# Patient Record
Sex: FEMALE | Race: WHITE | NOT HISPANIC OR LATINO | Employment: FULL TIME | URBAN - METROPOLITAN AREA
[De-identification: names, ages, dates, MRNs, and addresses within clinical notes are randomized per-mention and may not be internally consistent; named-entity substitution may affect disease eponyms.]

---

## 2020-08-12 ENCOUNTER — HISTORICAL (OUTPATIENT)
Dept: LAB | Facility: HOSPITAL | Age: 50
End: 2020-08-12

## 2020-08-12 LAB
ABS NEUT (OLG): 3 X10(3)/MCL (ref 2.1–9.2)
ALBUMIN SERPL-MCNC: 4 GM/DL (ref 3.5–5)
ALBUMIN/GLOB SERPL: 1.3 RATIO (ref 1.1–2)
ALP SERPL-CCNC: 50 UNIT/L (ref 40–150)
ALT SERPL-CCNC: 19 UNIT/L (ref 0–55)
AST SERPL-CCNC: 18 UNIT/L (ref 5–34)
BASOPHILS # BLD AUTO: 0.03 X10(3)/MCL (ref 0–0.2)
BASOPHILS NFR BLD AUTO: 0.6 % (ref 0–1)
BILIRUB SERPL-MCNC: 0.5 MG/DL (ref 0.2–1.2)
BILIRUBIN DIRECT+TOT PNL SERPL-MCNC: 0.2 MG/DL (ref 0–0.5)
BILIRUBIN DIRECT+TOT PNL SERPL-MCNC: 0.3 MG/DL (ref 0–0.8)
BUN SERPL-MCNC: 13.8 MG/DL (ref 7–18.7)
CALCIUM SERPL-MCNC: 9.3 MG/DL (ref 8.4–10.2)
CHLORIDE SERPL-SCNC: 105 MMOL/L (ref 98–107)
CHOLEST SERPL-MCNC: 166 MG/DL
CHOLEST/HDLC SERPL: 3 {RATIO} (ref 0–5)
CO2 SERPL-SCNC: 27 MMOL/L (ref 22–29)
CREAT SERPL-MCNC: 0.9 MG/DL (ref 0.57–1.11)
EOSINOPHIL # BLD AUTO: 0.15 X10(3)/MCL (ref 0–0.9)
EOSINOPHIL NFR BLD AUTO: 3 % (ref 0–6.4)
ERYTHROCYTE [DISTWIDTH] IN BLOOD BY AUTOMATED COUNT: 12.9 % (ref 11.5–17)
GLOBULIN SER-MCNC: 3 GM/DL (ref 2.4–3.5)
GLUCOSE SERPL-MCNC: 99 MG/DL (ref 74–100)
HCT VFR BLD AUTO: 42.9 % (ref 37–47)
HDLC SERPL-MCNC: 52 MG/DL (ref 40–60)
HGB BLD-MCNC: 14.5 GM/DL (ref 12–16)
IMM GRANULOCYTES # BLD AUTO: 0.01 10*3/UL (ref 0–0.02)
IMM GRANULOCYTES NFR BLD AUTO: 0.2 % (ref 0–0.43)
LDLC SERPL CALC-MCNC: 99 MG/DL (ref 50–140)
LYMPHOCYTES # BLD AUTO: 1.52 X10(3)/MCL (ref 0.6–4.6)
LYMPHOCYTES NFR BLD AUTO: 30.1 % (ref 16–44)
MCH RBC QN AUTO: 30.6 PG (ref 27–31)
MCHC RBC AUTO-ENTMCNC: 33.8 GM/DL (ref 33–36)
MCV RBC AUTO: 90.5 FL (ref 80–94)
MONOCYTES # BLD AUTO: 0.34 X10(3)/MCL (ref 0.1–1.3)
MONOCYTES NFR BLD AUTO: 6.7 % (ref 4–12.1)
NEUTROPHILS # BLD AUTO: 3 X10(3)/MCL (ref 2.1–9.2)
NEUTROPHILS NFR BLD AUTO: 59.4 % (ref 43–73)
NRBC BLD AUTO-RTO: 0 % (ref 0–0.2)
PLATELET # BLD AUTO: 200 X10(3)/MCL (ref 130–400)
PMV BLD AUTO: 9.8 FL (ref 7.4–10.4)
POTASSIUM SERPL-SCNC: 3.7 MMOL/L (ref 3.5–5.1)
PROT SERPL-MCNC: 7 GM/DL (ref 6.4–8.3)
RBC # BLD AUTO: 4.74 X10(6)/MCL (ref 4.2–5.4)
SODIUM SERPL-SCNC: 140 MMOL/L (ref 136–145)
T3FREE SERPL-MCNC: 2.7 PG/ML (ref 1.71–3.71)
T4 FREE SERPL-MCNC: 1.01 NG/DL (ref 0.7–1.48)
TRIGL SERPL-MCNC: 77 MG/DL (ref 0–150)
TSH SERPL-ACNC: 1.51 UIU/ML (ref 0.35–4.94)
VLDLC SERPL CALC-MCNC: 15 MG/DL
WBC # SPEC AUTO: 5 X10(3)/MCL (ref 4.5–11.5)

## 2021-03-05 ENCOUNTER — HISTORICAL (OUTPATIENT)
Dept: LAB | Facility: HOSPITAL | Age: 51
End: 2021-03-05

## 2021-03-05 LAB
ABS NEUT (OLG): 2.76 X10(3)/MCL (ref 2.1–9.2)
ALBUMIN SERPL-MCNC: 4.2 GM/DL (ref 3.5–5)
ALBUMIN/GLOB SERPL: 1.4 RATIO (ref 1.1–2)
ALP SERPL-CCNC: 46 UNIT/L (ref 40–150)
ALT SERPL-CCNC: 18 UNIT/L (ref 0–55)
AST SERPL-CCNC: 16 UNIT/L (ref 5–34)
BASOPHILS # BLD AUTO: 0.03 X10(3)/MCL (ref 0–0.2)
BASOPHILS NFR BLD AUTO: 0.6 % (ref 0–1)
BILIRUB SERPL-MCNC: 0.4 MG/DL (ref 0.2–1.2)
BILIRUBIN DIRECT+TOT PNL SERPL-MCNC: 0.2 MG/DL (ref 0–0.5)
BILIRUBIN DIRECT+TOT PNL SERPL-MCNC: 0.2 MG/DL (ref 0–0.8)
BUN SERPL-MCNC: 14.5 MG/DL (ref 7–18.7)
CALCIUM SERPL-MCNC: 9.2 MG/DL (ref 8.4–10.2)
CHLORIDE SERPL-SCNC: 107 MMOL/L (ref 98–107)
CHOLEST SERPL-MCNC: 163 MG/DL
CHOLEST/HDLC SERPL: 4 {RATIO} (ref 0–5)
CO2 SERPL-SCNC: 26 MMOL/L (ref 22–29)
CREAT SERPL-MCNC: 0.98 MG/DL (ref 0.57–1.11)
EOSINOPHIL # BLD AUTO: 0.32 X10(3)/MCL (ref 0–0.9)
EOSINOPHIL NFR BLD AUTO: 6.8 % (ref 0–6.4)
ERYTHROCYTE [DISTWIDTH] IN BLOOD BY AUTOMATED COUNT: 12.2 % (ref 11.5–17)
GLOBULIN SER-MCNC: 3 GM/DL (ref 2.4–3.5)
GLUCOSE SERPL-MCNC: 102 MG/DL (ref 74–100)
HCT VFR BLD AUTO: 44.5 % (ref 37–47)
HDLC SERPL-MCNC: 43 MG/DL (ref 40–60)
HGB BLD-MCNC: 14.5 GM/DL (ref 12–16)
IMM GRANULOCYTES # BLD AUTO: 0.01 10*3/UL (ref 0–0.02)
IMM GRANULOCYTES NFR BLD AUTO: 0.2 % (ref 0–0.43)
LDLC SERPL CALC-MCNC: 105 MG/DL (ref 50–140)
LYMPHOCYTES # BLD AUTO: 1.27 X10(3)/MCL (ref 0.6–4.6)
LYMPHOCYTES NFR BLD AUTO: 26.9 % (ref 16–44)
MCH RBC QN AUTO: 29.9 PG (ref 27–31)
MCHC RBC AUTO-ENTMCNC: 32.6 GM/DL (ref 33–36)
MCV RBC AUTO: 91.8 FL (ref 80–94)
MONOCYTES # BLD AUTO: 0.33 X10(3)/MCL (ref 0.1–1.3)
MONOCYTES NFR BLD AUTO: 7 % (ref 4–12.1)
NEUTROPHILS # BLD AUTO: 2.76 X10(3)/MCL (ref 2.1–9.2)
NEUTROPHILS NFR BLD AUTO: 58.5 % (ref 43–73)
NRBC BLD AUTO-RTO: 0 % (ref 0–0.2)
PLATELET # BLD AUTO: 220 X10(3)/MCL (ref 130–400)
PMV BLD AUTO: 9.8 FL (ref 7.4–10.4)
POTASSIUM SERPL-SCNC: 3.9 MMOL/L (ref 3.5–5.1)
PROT SERPL-MCNC: 7.2 GM/DL (ref 6.4–8.3)
RBC # BLD AUTO: 4.85 X10(6)/MCL (ref 4.2–5.4)
SODIUM SERPL-SCNC: 141 MMOL/L (ref 136–145)
T3FREE SERPL-MCNC: 2.98 PG/ML (ref 1.71–3.71)
T4 FREE SERPL-MCNC: 1.07 NG/DL (ref 0.7–1.48)
TRIGL SERPL-MCNC: 74 MG/DL (ref 0–150)
TSH SERPL-ACNC: 1.4 UIU/ML (ref 0.35–4.94)
VLDLC SERPL CALC-MCNC: 15 MG/DL
WBC # SPEC AUTO: 4.7 X10(3)/MCL (ref 4.5–11.5)

## 2021-09-28 LAB — DEPRECATED CALCIDIOL+CALCIFEROL SERPL-MC: 50.1 NG/ML (ref 30–100)

## 2021-11-02 LAB — PAP RECOMMENDATION EXT: NORMAL

## 2022-03-22 ENCOUNTER — HISTORICAL (OUTPATIENT)
Dept: LAB | Facility: HOSPITAL | Age: 52
End: 2022-03-22

## 2022-03-22 LAB
ABS NEUT (OLG): 2.29 (ref 2.1–9.2)
ALBUMIN SERPL-MCNC: 4.1 G/DL (ref 3.5–5)
ALBUMIN/GLOB SERPL: 1.5 {RATIO} (ref 1.1–2)
ALP SERPL-CCNC: 58 U/L (ref 40–150)
ALT SERPL-CCNC: 35 U/L (ref 0–55)
APPEARANCE, UA: CLEAR
AST SERPL-CCNC: 22 U/L (ref 5–34)
BASOPHILS # BLD AUTO: 0.03 10*3/UL (ref 0–0.2)
BASOPHILS NFR BLD AUTO: 0.7 % (ref 0–1)
BILIRUB SERPL-MCNC: 0.5 MG/DL (ref 0.2–1.2)
BILIRUB UR QL STRIP.AUTO: NEGATIVE
BILIRUB UR QL STRIP: NEGATIVE
BILIRUBIN DIRECT+TOT PNL SERPL-MCNC: 0.2 (ref 0–0.5)
BILIRUBIN DIRECT+TOT PNL SERPL-MCNC: 0.3 (ref 0–0.8)
BUN SERPL-MCNC: 14.1 MG/DL (ref 7–18.7)
CALCIUM SERPL-MCNC: 9.6 MG/DL (ref 8.4–10.2)
CHLORIDE SERPL-SCNC: 106 MMOL/L (ref 98–107)
CHOLEST SERPL-MCNC: 194 MG/DL
CHOLEST/HDLC SERPL: 4 {RATIO} (ref 0–5)
CO2 SERPL-SCNC: 26 MMOL/L (ref 22–29)
COLOR UR: YELLOW
CREAT SERPL-MCNC: 0.87 MG/DL (ref 0.57–1.11)
DEPRECATED CALCIDIOL+CALCIFEROL SERPL-MC: 59.7 NG/ML (ref 30–80)
DO MICRO?: NO
EOSINOPHIL # BLD AUTO: 0.24 10*3/UL (ref 0–0.9)
EOSINOPHIL NFR BLD AUTO: 5.4 % (ref 0–6.4)
ERYTHROCYTE [DISTWIDTH] IN BLOOD BY AUTOMATED COUNT: 12.6 % (ref 11.5–17)
GLOBULIN SER-MCNC: 2.8 G/DL (ref 2.4–3.5)
GLUCOSE (UA): NEGATIVE
GLUCOSE SERPL-MCNC: 105 MG/DL (ref 74–100)
GLUCOSE UR QL STRIP.AUTO: NEGATIVE
HCT VFR BLD AUTO: 43.3 % (ref 37–47)
HDLC SERPL-MCNC: 46 MG/DL (ref 40–60)
HEMOLYSIS INTERF INDEX SERPL-ACNC: 4
HGB BLD-MCNC: 14.3 G/DL (ref 12–16)
HGB UR QL STRIP: NEGATIVE
ICTERIC INTERF INDEX SERPL-ACNC: 1
IMM GRANULOCYTES # BLD AUTO: 0.01 10*3/UL (ref 0–0.02)
IMM GRANULOCYTES NFR BLD AUTO: 0.2 % (ref 0–0.43)
KETONES UR QL STRIP.AUTO: NEGATIVE
KETONES UR QL STRIP: NEGATIVE
LDLC SERPL CALC-MCNC: 123 MG/DL (ref 50–140)
LEUKOCYTE ESTERASE UR QL STRIP.AUTO: NEGATIVE
LEUKOCYTE ESTERASE UR QL STRIP: NEGATIVE
LIPEMIC INTERF INDEX SERPL-ACNC: 2
LYMPHOCYTES # BLD AUTO: 1.51 10*3/UL (ref 0.6–4.6)
LYMPHOCYTES NFR BLD AUTO: 34 % (ref 16–44)
MANUAL DIFF? (OHS): NO
MCH RBC QN AUTO: 29.4 PG (ref 27–31)
MCHC RBC AUTO-ENTMCNC: 33 G/DL (ref 33–36)
MCV RBC AUTO: 88.9 FL (ref 80–94)
MONOCYTES # BLD AUTO: 0.36 10*3/UL (ref 0.1–1.3)
MONOCYTES NFR BLD AUTO: 8.1 % (ref 4–12.1)
NEUTROPHILS # BLD AUTO: 2.29 10*3/UL (ref 2.1–9.2)
NEUTROPHILS NFR BLD AUTO: 51.6 % (ref 43–73)
NITRITE UR QL STRIP: NEGATIVE
NRBC BLD AUTO-RTO: 0 % (ref 0–0.2)
PH UR STRIP: 7 [PH] (ref 5–7)
PLATELET # BLD AUTO: 208 10*3/UL (ref 130–400)
PMV BLD AUTO: 9.5 FL (ref 7.4–10.4)
POTASSIUM SERPL-SCNC: 4.4 MMOL/L (ref 3.5–5.1)
PROT SERPL-MCNC: 6.9 G/DL (ref 6.4–8.3)
PROT UR QL STRIP.AUTO: NEGATIVE
PROT UR QL STRIP: NEGATIVE
RBC # BLD AUTO: 4.87 10*6/UL (ref 4.2–5.4)
RBC UR QL AUTO: NEGATIVE
SODIUM SERPL-SCNC: 142 MMOL/L (ref 136–145)
SP GR UR STRIP: 1.01 (ref 1–1.03)
T4 FREE SERPL-MCNC: 0.96 NG/DL (ref 0.7–1.48)
TRIGL SERPL-MCNC: 124 MG/DL (ref 0–150)
TSH SERPL-ACNC: 1.02 M[IU]/L (ref 0.35–4.94)
UROBILINOGEN UR STRIP-ACNC: 0.2
UROBILINOGEN UR STRIP-ACNC: NEGATIVE
VIT B12 SERPL-MCNC: 1946 PG/ML (ref 213–816)
VLDLC SERPL CALC-MCNC: 25 MG/DL
WBC # SPEC AUTO: 4.4 10*3/UL (ref 4.5–11.5)

## 2022-04-05 LAB — BCS RECOMMENDATION EXT: NORMAL

## 2022-04-10 ENCOUNTER — HISTORICAL (OUTPATIENT)
Dept: ADMINISTRATIVE | Facility: HOSPITAL | Age: 52
End: 2022-04-10

## 2022-04-28 VITALS
WEIGHT: 160.06 LBS | HEIGHT: 61 IN | DIASTOLIC BLOOD PRESSURE: 83 MMHG | OXYGEN SATURATION: 99 % | BODY MASS INDEX: 30.22 KG/M2 | SYSTOLIC BLOOD PRESSURE: 125 MMHG

## 2022-05-01 ENCOUNTER — HISTORICAL (OUTPATIENT)
Dept: ADMINISTRATIVE | Facility: HOSPITAL | Age: 52
End: 2022-05-01

## 2022-05-03 NOTE — HISTORICAL [facility] CERNER
This is a historical note converted from [employer]. Formatting and pictures may have been removed.  Please reference [employer] for original formatting and attached multimedia. Chief Complaint  Telemedicine: Repeat labs  History of Present Illness  This is a telemedicine note. Patient was treated using telemedicine, real time audio and video, according to [facility] protocols. I, Kailee Almodovar MD, conducted the visit from location identified below. The patient participated in the visit at a non-[facility] location selected by the patient (or patient?s representative), identified below. I am licensed in the state where the patient stated they are located. The patient (or patient?s representative) stated that they understood and accepted the privacy and security risks to their information at their location.  Patient was located at her home  Participant in this visit: Patient  I, distant provider, was located at : [address] [place] [id number]  ?  ?  Patient is a 50 yo WF via telemedicine today for 6 month follow up appt.  Last wellness: 3/16/2021  ?  ?  HPI:  still very fatigued  not exercising  she wants a CRP done because she has general aches and pain and wants to know if tumeric is helping her reduce this CRP level; we discussed utility of CRP for general inflammation is not recommended  she requests vitamin D level check  L sciatic bothering her  ?  Chronic Medical Conditions:  thyroid nodule - Dr. Mendosa following  hypothyroidism: on generic levothyroxine, sx controlled  chronic fatigue: unsure of etiology; sounds like GEMINI has a big part of this along with stress; her son has crohns disease and she finds herself so focused on his health or with her work that she does not take time for herself  Family history of breast cancer: genetic counseling completed, she is BRCA negative  ?  ?   GYN: Dr. Ann  last pap smear: 10/?2020, negative for malignancy  Mammogram: [facility] 3/30/2021 negative for malignancy  ?    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  Physical Exam  Vitals & Measurements  HT:?155.00?cm? WT:?72.600?kg? BMI:?30.22? LMP:?09/06/2021 00:00 CDT?  General: NAD, alert and oriented  Psych: cooperation, appropriate mood and affect?  Assessment/Plan  1.?Chronic fatigue?R53.82  unchanged  ?recommend to start daily exercise  create lifestyle modifications to help improve your fatigue and also mood  Ordered:  CBC w/ Auto Diff, Routine collect, *Est. 03/28/22 3:00:00 CDT, Blood, Order for future visit, *Est. Stop date 03/28/22 3:00:00 CDT, Lab Collect, Wellness examination  Lipid screening  Thyroid nodule  Hypothyroidism  Chronic fatigue  Vitamin D deficiency, 09/28/21...  Comprehensive Metabolic Panel, Routine collect, *Est. 03/28/22 3:00:00 CDT, Blood, Order for future visit, *Est. Stop date 03/28/22 3:00:00 CDT, Lab Collect, Wellness examination  Lipid screening  Thyroid nodule  Hypothyroidism  Chronic fatigue  Vitamin D deficiency, 09/28/21...  Free T4, Routine collect, *Est. 03/28/22 3:00:00 CDT, Blood, Order for future visit, *Est. Stop date 03/28/22 3:00:00 CDT, Lab Collect, Wellness examination  Lipid screening  Thyroid nodule  Hypothyroidism  Chronic fatigue  Vitamin D deficiency, 09/28/21...  Lipid Panel, Routine collect, *Est. 03/28/22 3:00:00 CDT, Blood, Order for future visit, *Est. Stop date 03/28/22 3:00:00 CDT,  Lab Collect, Wellness examination  Lipid screening  Thyroid nodule  Hypothyroidism  Chronic fatigue  Vitamin D deficiency, 09/28/21...  Thyroid Stimulating Hormone, Routine collect, *Est. 03/28/22 3:00:00 CDT, Blood, Order for future visit, *Est. Stop date 03/28/22 3:00:00 CDT, Lab Collect, Wellness examination  Lipid screening  Thyroid nodule  Hypothyroidism  Chronic fatigue  Vitamin D deficiency, 09/28/21...  Urinalysis with Reflex, Routine collect, Urine, Order for future visit, *Est. 03/28/22 3:00:00 CDT, *Est. Stop date 03/28/22 3:00:00 CDT, Nurse collect, Wellness examination  Lipid screening  Thyroid nodule  Hypothyroidism  Chronic fatigue  Vitamin D deficiency  Vitamin B12 Level, Routine collect, *Est. 03/28/22 3:00:00 CDT, Blood, Order for future visit, *Est. Stop date 03/28/22 3:00:00 CDT, Lab Collect, Vitamin D deficiency  Chronic fatigue  Hypothyroidism  Thyroid nodule  Wellness examination, 09/28/21 8:01:00 CDT  Vitamin D, 25-Hydroxy Level, Routine collect, *Est. 03/28/22 3:00:00 CDT, Blood, Order for future visit, *Est. Stop date 03/28/22 3:00:00 CDT, Lab Collect, Vitamin D deficiency  Chronic fatigue  Hypothyroidism  Thyroid nodule  Wellness examination, 09/28/21 8:01:00 CDT  ?  2.?Hypothyroidism?E03.9  follow up with Dr. Mendosa- had repeat labs done recently ordered from their office- f/u with them to see utility of dose adjustments  Ordered:  CBC w/ Auto Diff, Routine collect, *Est. 03/28/22 3:00:00 CDT, Blood, Order for future visit, *Est. Stop date 03/28/22 3:00:00 CDT, Lab Collect, Wellness examination  Lipid screening  Thyroid nodule  Hypothyroidism  Chronic fatigue  Vitamin D deficiency, 09/28/21...  Comprehensive Metabolic Panel, Routine collect, *Est. 03/28/22 3:00:00 CDT, Blood, Order for future visit, *Est. Stop date 03/28/22 3:00:00 CDT, Lab Collect, Wellness examination  Lipid screening  Thyroid nodule  Hypothyroidism  Chronic fatigue  Vitamin D  deficiency, 09/28/21...  Free T4, Routine collect, *Est. 03/28/22 3:00:00 CDT, Blood, Order for future visit, *Est. Stop date 03/28/22 3:00:00 CDT, Lab Collect, Wellness examination  Lipid screening  Thyroid nodule  Hypothyroidism  Chronic fatigue  Vitamin D deficiency, 09/28/21...  Lipid Panel, Routine collect, *Est. 03/28/22 3:00:00 CDT, Blood, Order for future visit, *Est. Stop date 03/28/22 3:00:00 CDT, Lab Collect, Wellness examination  Lipid screening  Thyroid nodule  Hypothyroidism  Chronic fatigue  Vitamin D deficiency, 09/28/21...  Thyroid Stimulating Hormone, Routine collect, *Est. 03/28/22 3:00:00 CDT, Blood, Order for future visit, *Est. Stop date 03/28/22 3:00:00 CDT, Lab Collect, Wellness examination  Lipid screening  Thyroid nodule  Hypothyroidism  Chronic fatigue  Vitamin D deficiency, 09/28/21...  Urinalysis with Reflex, Routine collect, Urine, Order for future visit, *Est. 03/28/22 3:00:00 CDT, *Est. Stop date 03/28/22 3:00:00 CDT, Nurse collect, Wellness examination  Lipid screening  Thyroid nodule  Hypothyroidism  Chronic fatigue  Vitamin D deficiency  Vitamin B12 Level, Routine collect, *Est. 03/28/22 3:00:00 CDT, Blood, Order for future visit, *Est. Stop date 03/28/22 3:00:00 CDT, Lab Collect, Vitamin D deficiency  Chronic fatigue  Hypothyroidism  Thyroid nodule  Wellness examination, 09/28/21 8:01:00 CDT  Vitamin D, 25-Hydroxy Level, Routine collect, *Est. 03/28/22 3:00:00 CDT, Blood, Order for future visit, *Est. Stop date 03/28/22 3:00:00 CDT, Lab Collect, Vitamin D deficiency  Chronic fatigue  Hypothyroidism  Thyroid nodule  Wellness examination, 09/28/21 8:01:00 CDT  ?  3.?Thyroid nodule?E04.1  stable per patient  Ordered:  CBC w/ Auto Diff, Routine collect, *Est. 03/28/22 3:00:00 CDT, Blood, Order for future visit, *Est. Stop date 03/28/22 3:00:00 CDT, Lab Collect, Wellness examination  Lipid screening  Thyroid nodule  Hypothyroidism  Chronic fatigue   Vitamin D deficiency, 09/28/21...  Comprehensive Metabolic Panel, Routine collect, *Est. 03/28/22 3:00:00 CDT, Blood, Order for future visit, *Est. Stop date 03/28/22 3:00:00 CDT, Lab Collect, Wellness examination  Lipid screening  Thyroid nodule  Hypothyroidism  Chronic fatigue  Vitamin D deficiency, 09/28/21...  Free T4, Routine collect, *Est. 03/28/22 3:00:00 CDT, Blood, Order for future visit, *Est. Stop date 03/28/22 3:00:00 CDT, Lab Collect, Wellness examination  Lipid screening  Thyroid nodule  Hypothyroidism  Chronic fatigue  Vitamin D deficiency, 09/28/21...  Lipid Panel, Routine collect, *Est. 03/28/22 3:00:00 CDT, Blood, Order for future visit, *Est. Stop date 03/28/22 3:00:00 CDT, Lab Collect, Wellness examination  Lipid screening  Thyroid nodule  Hypothyroidism  Chronic fatigue  Vitamin D deficiency, 09/28/21...  Thyroid Stimulating Hormone, Routine collect, *Est. 03/28/22 3:00:00 CDT, Blood, Order for future visit, *Est. Stop date 03/28/22 3:00:00 CDT, Lab Collect, Wellness examination  Lipid screening  Thyroid nodule  Hypothyroidism  Chronic fatigue  Vitamin D deficiency, 09/28/21...  Urinalysis with Reflex, Routine collect, Urine, Order for future visit, *Est. 03/28/22 3:00:00 CDT, *Est. Stop date 03/28/22 3:00:00 CDT, Nurse collect, Wellness examination  Lipid screening  Thyroid nodule  Hypothyroidism  Chronic fatigue  Vitamin D deficiency  Vitamin B12 Level, Routine collect, *Est. 03/28/22 3:00:00 CDT, Blood, Order for future visit, *Est. Stop date 03/28/22 3:00:00 CDT, Lab Collect, Vitamin D deficiency  Chronic fatigue  Hypothyroidism  Thyroid nodule  Wellness examination, 09/28/21 8:01:00 CDT  Vitamin D, 25-Hydroxy Level, Routine collect, *Est. 03/28/22 3:00:00 CDT, Blood, Order for future visit, *Est. Stop date 03/28/22 3:00:00 CDT, Lab Collect, Vitamin D deficiency  Chronic fatigue  Hypothyroidism  Thyroid nodule  Wellness examination, 09/28/21 8:01:00  CDT  ?  4.?Wellness examination?Z00.00  fasting labs ordered 3/22  Ordered:  CBC w/ Auto Diff, Routine collect, *Est. 03/28/22 3:00:00 CDT, Blood, Order for future visit, *Est. Stop date 03/28/22 3:00:00 CDT, Lab Collect, Wellness examination  Lipid screening  Thyroid nodule  Hypothyroidism  Chronic fatigue  Vitamin D deficiency, 09/28/21...  Comprehensive Metabolic Panel, Routine collect, *Est. 03/28/22 3:00:00 CDT, Blood, Order for future visit, *Est. Stop date 03/28/22 3:00:00 CDT, Lab Collect, Wellness examination  Lipid screening  Thyroid nodule  Hypothyroidism  Chronic fatigue  Vitamin D deficiency, 09/28/21...  Free T4, Routine collect, *Est. 03/28/22 3:00:00 CDT, Blood, Order for future visit, *Est. Stop date 03/28/22 3:00:00 CDT, Lab Collect, Wellness examination  Lipid screening  Thyroid nodule  Hypothyroidism  Chronic fatigue  Vitamin D deficiency, 09/28/21...  Lipid Panel, Routine collect, *Est. 03/28/22 3:00:00 CDT, Blood, Order for future visit, *Est. Stop date 03/28/22 3:00:00 CDT, Lab Collect, Wellness examination  Lipid screening  Thyroid nodule  Hypothyroidism  Chronic fatigue  Vitamin D deficiency, 09/28/21...  Thyroid Stimulating Hormone, Routine collect, *Est. 03/28/22 3:00:00 CDT, Blood, Order for future visit, *Est. Stop date 03/28/22 3:00:00 CDT, Lab Collect, Wellness examination  Lipid screening  Thyroid nodule  Hypothyroidism  Chronic fatigue  Vitamin D deficiency, 09/28/21...  Urinalysis with Reflex, Routine collect, Urine, Order for future visit, *Est. 03/28/22 3:00:00 CDT, *Est. Stop date 03/28/22 3:00:00 CDT, Nurse collect, Wellness examination  Lipid screening  Thyroid nodule  Hypothyroidism  Chronic fatigue  Vitamin D deficiency  Vitamin B12 Level, Routine collect, *Est. 03/28/22 3:00:00 CDT, Blood, Order for future visit, *Est. Stop date 03/28/22 3:00:00 CDT, Lab Collect, Vitamin D deficiency  Chronic fatigue  Hypothyroidism  Thyroid nodule  Wellness  examination, 09/28/21 8:01:00 CDT  Vitamin D, 25-Hydroxy Level, Routine collect, *Est. 03/28/22 3:00:00 CDT, Blood, Order for future visit, *Est. Stop date 03/28/22 3:00:00 CDT, Lab Collect, Vitamin D deficiency  Chronic fatigue  Hypothyroidism  Thyroid nodule  Wellness examination, 09/28/21 8:01:00 CDT  ?  5.?Lipid screening?Z13.220  check lipid  Ordered:  CBC w/ Auto Diff, Routine collect, *Est. 03/28/22 3:00:00 CDT, Blood, Order for future visit, *Est. Stop date 03/28/22 3:00:00 CDT, Lab Collect, Wellness examination  Lipid screening  Thyroid nodule  Hypothyroidism  Chronic fatigue  Vitamin D deficiency, 09/28/21...  Comprehensive Metabolic Panel, Routine collect, *Est. 03/28/22 3:00:00 CDT, Blood, Order for future visit, *Est. Stop date 03/28/22 3:00:00 CDT, Lab Collect, Wellness examination  Lipid screening  Thyroid nodule  Hypothyroidism  Chronic fatigue  Vitamin D deficiency, 09/28/21...  Free T4, Routine collect, *Est. 03/28/22 3:00:00 CDT, Blood, Order for future visit, *Est. Stop date 03/28/22 3:00:00 CDT, Lab Collect, Wellness examination  Lipid screening  Thyroid nodule  Hypothyroidism  Chronic fatigue  Vitamin D deficiency, 09/28/21...  Lipid Panel, Routine collect, *Est. 03/28/22 3:00:00 CDT, Blood, Order for future visit, *Est. Stop date 03/28/22 3:00:00 CDT, Lab Collect, Wellness examination  Lipid screening  Thyroid nodule  Hypothyroidism  Chronic fatigue  Vitamin D deficiency, 09/28/21...  Thyroid Stimulating Hormone, Routine collect, *Est. 03/28/22 3:00:00 CDT, Blood, Order for future visit, *Est. Stop date 03/28/22 3:00:00 CDT, Lab Collect, Wellness examination  Lipid screening  Thyroid nodule  Hypothyroidism  Chronic fatigue  Vitamin D deficiency, 09/28/21...  Urinalysis with Reflex, Routine collect, Urine, Order for future visit, *Est. 03/28/22 3:00:00 CDT, *Est. Stop date 03/28/22 3:00:00 CDT, Nurse collect, Wellness examination  Lipid screening  Thyroid nodule   Hypothyroidism  Chronic fatigue  Vitamin D deficiency  ?  6.?Vitamin D deficiency?E55.9  check vitamin D now  check vitamin D at wellness  Ordered:  CBC w/ Auto Diff, Routine collect, *Est. 03/28/22 3:00:00 CDT, Blood, Order for future visit, *Est. Stop date 03/28/22 3:00:00 CDT, Lab Collect, Wellness examination  Lipid screening  Thyroid nodule  Hypothyroidism  Chronic fatigue  Vitamin D deficiency, 09/28/21...  Comprehensive Metabolic Panel, Routine collect, *Est. 03/28/22 3:00:00 CDT, Blood, Order for future visit, *Est. Stop date 03/28/22 3:00:00 CDT, Lab Collect, Wellness examination  Lipid screening  Thyroid nodule  Hypothyroidism  Chronic fatigue  Vitamin D deficiency, 09/28/21...  Free T4, Routine collect, *Est. 03/28/22 3:00:00 CDT, Blood, Order for future visit, *Est. Stop date 03/28/22 3:00:00 CDT, Lab Collect, Wellness examination  Lipid screening  Thyroid nodule  Hypothyroidism  Chronic fatigue  Vitamin D deficiency, 09/28/21...  Lipid Panel, Routine collect, *Est. 03/28/22 3:00:00 CDT, Blood, Order for future visit, *Est. Stop date 03/28/22 3:00:00 CDT, Lab Collect, Wellness examination  Lipid screening  Thyroid nodule  Hypothyroidism  Chronic fatigue  Vitamin D deficiency, 09/28/21...  Thyroid Stimulating Hormone, Routine collect, *Est. 03/28/22 3:00:00 CDT, Blood, Order for future visit, *Est. Stop date 03/28/22 3:00:00 CDT, Lab Collect, Wellness examination  Lipid screening  Thyroid nodule  Hypothyroidism  Chronic fatigue  Vitamin D deficiency, 09/28/21...  Urinalysis with Reflex, Routine collect, Urine, Order for future visit, *Est. 03/28/22 3:00:00 CDT, *Est. Stop date 03/28/22 3:00:00 CDT, Nurse collect, Wellness examination  Lipid screening  Thyroid nodule  Hypothyroidism  Chronic fatigue  Vitamin D deficiency  Vitamin B12 Level, Routine collect, *Est. 03/28/22 3:00:00 CDT, Blood, Order for future visit, *Est. Stop date 03/28/22 3:00:00 CDT, Lab Collect, Vitamin D  deficiency  Chronic fatigue  Hypothyroidism  Thyroid nodule  Wellness examination, 09/28/21 8:01:00 CDT  Vitamin D, 25-Hydroxy Level, Routine collect, 09/28/21 3:00:00 CDT, Blood, [facility], Stop date 09/28/21 8:32:00 CDT, Lab Collect, Vitamin D deficiency, 09/28/21 3:00:00 CDT  Vitamin D, 25-Hydroxy Level, Routine collect, *Est. 03/28/22 3:00:00 CDT, Blood, Order for future visit, *Est. Stop date 03/28/22 3:00:00 CDT, Lab Collect, Vitamin D deficiency  Chronic fatigue  Hypothyroidism  Thyroid nodule  Wellness examination, 09/28/21 8:01:00 CDT  ?  7.?Left sciatic nerve pain?M54.32  discussed options for treatment including PT or chiropractic care with massage  patient would like to?try chiropracter evaluation/treatment  will refer to Dr. Coffman  ?  Face to face time spent with patient exceeds?25 minutes, over 50% of which was used for education and counseling regarding medical conditions, current medications including risk/benefit and side effects/adverse events, over the counter medications-uses/doses, home self-care?and contact precautions, and red flags and indications for immediate medical attention.??The patient is receptive, expresses understanding and is agreeable to plan. All questions answered  ?  RTC wellness 3/2022 with fasting labs due before, orders are in   Problem List/Past Medical History  Ongoing  Antibody response exam  Chronic fatigue  Family history of breast cancer in sister  Hypothyroidism  Left sciatic nerve pain  Lipid screening  Obesity  GEMINI on CPAP  Screening mammogram, encounter for  Thyroid nodule  Wellness examination  Historical  Right otitis media with effusion  Procedure/Surgical History  MMG (03/30/2021)   Medications  C Complex TR  Centrum Adults, Oral, Daily  Citracal + D, BID  Culturelle [facility] Wellness oral capsule, Oral, Daily  Flonase 50 mcg/inh nasal spray, 1 spray(s), Nasal, BID  folic acid 1 mg oral tablet, 1 mg= 1 tab(s), Oral, Daily  levothyroxine 75 mcg  (0.075 mg) oral tablet, 75 mcg= 1 tab(s), Oral, qAM  magnesium oxide 500 mg oral tablet, 500 mg= 1 tab(s), Oral, Daily  montelukast 10 mg oral TABLET, See Instructions  Omega-3 oral capsule  Restasis 0.05% ophthalmic emulsion, 1 drop(s), Eye-Both, q12hr  tumeric  Vitamin B12  Vitamin D3  Allergies  codeine?(Unknown)  iodine  Social History  Abuse/Neglect  No, No, Yes, 09/28/2021  No, No, Yes, 08/18/2020  No, No, Yes, 02/18/2020  No, 10/25/2019  Alcohol  Never, 10/25/2019  Employment/School  Employed, Work/School description: Pharmacist., 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 Sexual orientation: Straight or heterosexual., 02/18/2020  Substance Use  Never, 10/25/2019  Tobacco  Never (less than 100 in lifetime), No, 09/28/2021  Never (less than 100 in lifetime), No, Smokeless Tobacco Use: Never., 08/18/2020  Never (less than 100 in lifetime), No, Smokeless Tobacco Use: Never., 02/18/2020  Never (less than 100 in lifetime), N/A, 10/25/2019  Family History  Family history is unknown  Health Maintenance  Health Maintenance  ???Pending?(in the next year)  ??? ??Refused?  ??? ? ? ?Tetanus Vaccine due??09/28/21??and every 10??year(s)  ??? ??Due In Future?  ??? ? ? ?Obesity Screening not due until??01/01/22??and every 1??year(s)  ??? ? ? ?Alcohol Misuse Screening not due until??01/02/22??and every 1??year(s)  ???Satisfied?(in the past 1 year)  ??? ??Satisfied?  ??? ? ? ?ADL Screening on??09/28/21.??Satisfied by SAMAN Contreras ConTerrarika  ??? ? ? ?Alcohol Misuse Screening on??03/16/21.??Satisfied by Ayse Dia.  ??? ? ? ?Blood Pressure Screening on??03/16/21.??Satisfied by Ayse Dia.  ??? ? ? ?Body Mass Index Check on??09/28/21.??Satisfied by SAMAN Contreras ConTerrarika  ??? ? ? ?Breast Cancer Screening on??03/30/21.  ??? ? ? ?Cervical Cancer Screening on??10/13/20.??Satisfied by  Che Fonseca  ??? ? ? ?Depression Screening on??09/28/21.??Satisfied by SAMAN Contreras ConTerrarika  ??? ? ? ?Diabetes Screening on??03/05/21.??Satisfied by Anahi Soto  ??? ? ? ?Influenza Vaccine on??09/28/21.??Satisfied by SAMAN Contreras ConTerrarika  ??? ? ? ?Lipid Screening on??03/05/21.??Satisfied by Anahi Soto  ??? ? ? ?Obesity Screening on??09/28/21.??Satisfied by SAMAN Contreras ConTerrarika  ??? ??Refused?  ??? ? ? ?Tetanus Vaccine on??09/28/21.??Recorded by SAMAN Contreras ConTerrarika??Reason: Patient Refuses  ?

## 2022-09-27 ENCOUNTER — OFFICE VISIT (OUTPATIENT)
Dept: FAMILY MEDICINE | Facility: CLINIC | Age: 52
End: 2022-09-27
Payer: COMMERCIAL

## 2022-09-27 VITALS
WEIGHT: 159.69 LBS | OXYGEN SATURATION: 99 % | BODY MASS INDEX: 30.15 KG/M2 | HEART RATE: 92 BPM | SYSTOLIC BLOOD PRESSURE: 118 MMHG | HEIGHT: 61 IN | RESPIRATION RATE: 18 BRPM | TEMPERATURE: 97 F | DIASTOLIC BLOOD PRESSURE: 70 MMHG

## 2022-09-27 DIAGNOSIS — R73.01 ELEVATED FASTING GLUCOSE: ICD-10-CM

## 2022-09-27 DIAGNOSIS — E04.1 THYROID NODULE: ICD-10-CM

## 2022-09-27 DIAGNOSIS — E03.9 HYPOTHYROIDISM, UNSPECIFIED TYPE: ICD-10-CM

## 2022-09-27 DIAGNOSIS — Z80.3 FAMILY HISTORY OF BREAST CANCER: ICD-10-CM

## 2022-09-27 DIAGNOSIS — F41.9 ANXIETY: ICD-10-CM

## 2022-09-27 DIAGNOSIS — Z11.59 NEED FOR HEPATITIS C SCREENING TEST: ICD-10-CM

## 2022-09-27 DIAGNOSIS — R53.82 CHRONIC FATIGUE: ICD-10-CM

## 2022-09-27 DIAGNOSIS — Z00.00 WELLNESS EXAMINATION: ICD-10-CM

## 2022-09-27 DIAGNOSIS — Z11.4 ENCOUNTER FOR SCREENING FOR HIV: ICD-10-CM

## 2022-09-27 DIAGNOSIS — Z12.11 COLON CANCER SCREENING: Primary | ICD-10-CM

## 2022-09-27 PROCEDURE — 3008F PR BODY MASS INDEX (BMI) DOCUMENTED: ICD-10-PCS | Mod: CPTII,,, | Performed by: INTERNAL MEDICINE

## 2022-09-27 PROCEDURE — 1159F MED LIST DOCD IN RCRD: CPT | Mod: CPTII,,, | Performed by: INTERNAL MEDICINE

## 2022-09-27 PROCEDURE — 3078F PR MOST RECENT DIASTOLIC BLOOD PRESSURE < 80 MM HG: ICD-10-PCS | Mod: CPTII,,, | Performed by: INTERNAL MEDICINE

## 2022-09-27 PROCEDURE — 1160F PR REVIEW ALL MEDS BY PRESCRIBER/CLIN PHARMACIST DOCUMENTED: ICD-10-PCS | Mod: CPTII,,, | Performed by: INTERNAL MEDICINE

## 2022-09-27 PROCEDURE — 3078F DIAST BP <80 MM HG: CPT | Mod: CPTII,,, | Performed by: INTERNAL MEDICINE

## 2022-09-27 PROCEDURE — 99213 OFFICE O/P EST LOW 20 MIN: CPT | Mod: ,,, | Performed by: INTERNAL MEDICINE

## 2022-09-27 PROCEDURE — 99213 PR OFFICE/OUTPT VISIT, EST, LEVL III, 20-29 MIN: ICD-10-PCS | Mod: ,,, | Performed by: INTERNAL MEDICINE

## 2022-09-27 PROCEDURE — 1159F PR MEDICATION LIST DOCUMENTED IN MEDICAL RECORD: ICD-10-PCS | Mod: CPTII,,, | Performed by: INTERNAL MEDICINE

## 2022-09-27 PROCEDURE — 3074F PR MOST RECENT SYSTOLIC BLOOD PRESSURE < 130 MM HG: ICD-10-PCS | Mod: CPTII,,, | Performed by: INTERNAL MEDICINE

## 2022-09-27 PROCEDURE — 3008F BODY MASS INDEX DOCD: CPT | Mod: CPTII,,, | Performed by: INTERNAL MEDICINE

## 2022-09-27 PROCEDURE — 3074F SYST BP LT 130 MM HG: CPT | Mod: CPTII,,, | Performed by: INTERNAL MEDICINE

## 2022-09-27 PROCEDURE — 1160F RVW MEDS BY RX/DR IN RCRD: CPT | Mod: CPTII,,, | Performed by: INTERNAL MEDICINE

## 2022-09-27 RX ORDER — LEVOTHYROXINE SODIUM 75 UG/1
50 TABLET ORAL EVERY MORNING
COMMUNITY
Start: 2022-06-13 | End: 2023-10-17 | Stop reason: ALTCHOICE

## 2022-09-27 RX ORDER — KETOROLAC TROMETHAMINE 10 MG/1
10 TABLET, FILM COATED ORAL 4 TIMES DAILY PRN
COMMUNITY
Start: 2022-08-17

## 2022-09-27 RX ORDER — FOLIC ACID 1 MG/1
1000 TABLET ORAL DAILY
COMMUNITY
Start: 2022-09-10

## 2022-09-27 RX ORDER — IBUPROFEN 800 MG/1
800 TABLET ORAL
COMMUNITY
Start: 2022-05-09

## 2022-09-27 RX ORDER — SODIUM FLUORIDE 5 MG/ML
PASTE, DENTIFRICE DENTAL NIGHTLY
COMMUNITY
Start: 2022-09-23

## 2022-09-27 NOTE — PROGRESS NOTES
Subjective:      Patient ID: Glenda Franklin is a 52 y.o. female.    Chief Complaint: Follow-up    HPI    6 month f/u visit today, no labs   Last wellness 03/29/2022    Doing well today  Having some menstrual irregularity- going through perimenopause   Had a good birthday, anxiety is controlled  Some TMJ but improving, had to see ENT      Chronic Medical Conditions:  thyroid nodule - Dr. Mendosa following, no imaging for 2022  hypothyroidism: on generic levothyroxine, sx controlled  chronic fatigue: unsure of etiology; sounds like GEMINI has a big part of this along with stress; her son has crohn's disease and she finds herself so focused on his health or with her work that she does not take time for herself  Family history of breast cancer: genetic counseling completed, she is BRCA negative      GYN: Dr. 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   HENT:  Negative for congestion, sinus pressure and sore throat.    Respiratory:  Negative for cough and shortness of breath.    Cardiovascular:  Negative for chest pain and palpitations.   Gastrointestinal:  Negative for abdominal pain and nausea.   Skin:  Negative for rash.   A comprehensive ROS was performed and is negative except  "as noted above.  Objective:     Vitals:    09/27/22 0903   BP: 118/70   BP Location: Left arm   Patient Position: Sitting   BP Method: Large (Automatic)   Pulse: 92   Resp: 18   Temp: 97.4 °F (36.3 °C)   TempSrc: Oral   SpO2: 99%   Weight: 72.4 kg (159 lb 11.2 oz)   Height: 5' 1" (1.549 m)     Physical Exam  Vitals and nursing note reviewed.   Constitutional:       General: She is not in acute distress.     Appearance: She is well-developed. She is not diaphoretic.   HENT:      Head: Normocephalic and atraumatic.   Eyes:      General:         Right eye: No discharge.         Left eye: No discharge.      Conjunctiva/sclera: Conjunctivae normal.      Pupils: Pupils are equal, round, and reactive to light.   Neck:      Thyroid: No thyromegaly.   Cardiovascular:      Rate and Rhythm: Normal rate and regular rhythm.      Heart sounds: Normal heart sounds. No murmur heard.  Pulmonary:      Effort: Pulmonary effort is normal. No respiratory distress.      Breath sounds: Normal breath sounds. No wheezing or rales.   Abdominal:      General: There is no distension.      Palpations: Abdomen is soft.      Tenderness: There is no abdominal tenderness.   Musculoskeletal:      Cervical back: Normal range of motion and neck supple.   Lymphadenopathy:      Cervical: No cervical adenopathy.   Skin:     General: Skin is warm and dry.   Neurological:      Mental Status: She is alert and oriented to person, place, and time.   Psychiatric:         Mood and Affect: Mood normal.         Behavior: Behavior normal.     Assessment/Plan:     1. Colon cancer screening  -     Ambulatory referral/consult to Gastroenterology    2. Thyroid nodule  Stable, US due next year with Deondre, labs done, she says she has not heard back from him  3. Hypothyroidism, unspecified type  Stable, continue current medication  4. Chronic fatigue  Stable, advised patient to restart exercise on ellipitical as this helped her before  5. Family history of breast " cancer  Up to date on screening  6. Anxiety   Stable, controlled, declines medication treatment for now      Follow up in about 6 months (around 3/27/2023) for Annual Wellness-labs due before .

## 2022-10-19 ENCOUNTER — DOCUMENTATION ONLY (OUTPATIENT)
Dept: FAMILY MEDICINE | Facility: CLINIC | Age: 52
End: 2022-10-19
Payer: COMMERCIAL

## 2023-03-28 ENCOUNTER — LAB VISIT (OUTPATIENT)
Dept: LAB | Facility: HOSPITAL | Age: 53
End: 2023-03-28
Attending: INTERNAL MEDICINE
Payer: COMMERCIAL

## 2023-03-28 DIAGNOSIS — R53.82 CHRONIC FATIGUE: ICD-10-CM

## 2023-03-28 DIAGNOSIS — R73.01 ELEVATED FASTING GLUCOSE: ICD-10-CM

## 2023-03-28 DIAGNOSIS — E04.1 THYROID NODULE: ICD-10-CM

## 2023-03-28 DIAGNOSIS — Z11.59 NEED FOR HEPATITIS C SCREENING TEST: ICD-10-CM

## 2023-03-28 DIAGNOSIS — E03.9 HYPOTHYROIDISM, UNSPECIFIED TYPE: ICD-10-CM

## 2023-03-28 DIAGNOSIS — F41.9 ANXIETY: ICD-10-CM

## 2023-03-28 DIAGNOSIS — Z11.4 ENCOUNTER FOR SCREENING FOR HIV: ICD-10-CM

## 2023-03-28 DIAGNOSIS — Z00.00 WELLNESS EXAMINATION: ICD-10-CM

## 2023-03-28 DIAGNOSIS — Z80.3 FAMILY HISTORY OF BREAST CANCER: ICD-10-CM

## 2023-03-28 LAB
ALBUMIN SERPL-MCNC: 4.3 G/DL (ref 3.5–5)
ALBUMIN/GLOB SERPL: 1.6 RATIO (ref 1.1–2)
ALP SERPL-CCNC: 56 UNIT/L (ref 40–150)
ALT SERPL-CCNC: 22 UNIT/L (ref 0–55)
AST SERPL-CCNC: 17 UNIT/L (ref 5–34)
BASOPHILS # BLD AUTO: 0.02 X10(3)/MCL (ref 0–0.2)
BASOPHILS NFR BLD AUTO: 0.6 %
BILIRUBIN DIRECT+TOT PNL SERPL-MCNC: 0.3 MG/DL
BUN SERPL-MCNC: 14.2 MG/DL (ref 9.8–20.1)
CALCIUM SERPL-MCNC: 10.1 MG/DL (ref 8.4–10.2)
CHLORIDE SERPL-SCNC: 110 MMOL/L (ref 98–107)
CHOLEST SERPL-MCNC: 192 MG/DL
CHOLEST/HDLC SERPL: 4 {RATIO} (ref 0–5)
CO2 SERPL-SCNC: 26 MMOL/L (ref 22–29)
CREAT SERPL-MCNC: 0.93 MG/DL (ref 0.55–1.02)
EOSINOPHIL # BLD AUTO: 0.13 X10(3)/MCL (ref 0–0.9)
EOSINOPHIL NFR BLD AUTO: 3.6 %
ERYTHROCYTE [DISTWIDTH] IN BLOOD BY AUTOMATED COUNT: 11.9 % (ref 11.5–17)
EST. AVERAGE GLUCOSE BLD GHB EST-MCNC: 102.5 MG/DL
GFR SERPLBLD CREATININE-BSD FMLA CKD-EPI: >60 MLS/MIN/1.73/M2
GLOBULIN SER-MCNC: 2.7 GM/DL (ref 2.4–3.5)
GLUCOSE SERPL-MCNC: 104 MG/DL (ref 74–100)
HBA1C MFR BLD: 5.2 %
HCT VFR BLD AUTO: 46.5 % (ref 37–47)
HCV AB SERPL QL IA: NONREACTIVE
HDLC SERPL-MCNC: 50 MG/DL (ref 35–60)
HGB BLD-MCNC: 15.2 G/DL (ref 12–16)
HIV 1+2 AB+HIV1 P24 AG SERPL QL IA: NONREACTIVE
IMM GRANULOCYTES # BLD AUTO: 0.01 X10(3)/MCL (ref 0–0.04)
IMM GRANULOCYTES NFR BLD AUTO: 0.3 %
LDLC SERPL CALC-MCNC: 128 MG/DL (ref 50–140)
LYMPHOCYTES # BLD AUTO: 1.38 X10(3)/MCL (ref 0.6–4.6)
LYMPHOCYTES NFR BLD AUTO: 38.3 %
MCH RBC QN AUTO: 29.6 PG (ref 27–31)
MCHC RBC AUTO-ENTMCNC: 32.7 G/DL (ref 33–36)
MCV RBC AUTO: 90.5 FL (ref 80–94)
MONOCYTES # BLD AUTO: 0.25 X10(3)/MCL (ref 0.1–1.3)
MONOCYTES NFR BLD AUTO: 6.9 %
NEUTROPHILS # BLD AUTO: 1.81 X10(3)/MCL (ref 2.1–9.2)
NEUTROPHILS NFR BLD AUTO: 50.3 %
NRBC BLD AUTO-RTO: 0 %
PLATELET # BLD AUTO: 222 X10(3)/MCL (ref 130–400)
PMV BLD AUTO: 9.6 FL (ref 7.4–10.4)
POTASSIUM SERPL-SCNC: 4.5 MMOL/L (ref 3.5–5.1)
PROT SERPL-MCNC: 7 GM/DL (ref 6.4–8.3)
RBC # BLD AUTO: 5.14 X10(6)/MCL (ref 4.2–5.4)
SODIUM SERPL-SCNC: 145 MMOL/L (ref 136–145)
TRIGL SERPL-MCNC: 71 MG/DL (ref 37–140)
TSH SERPL-ACNC: 0.26 UIU/ML (ref 0.35–4.94)
VLDLC SERPL CALC-MCNC: 14 MG/DL
WBC # SPEC AUTO: 3.6 X10(3)/MCL (ref 4.5–11.5)

## 2023-03-28 PROCEDURE — 86803 HEPATITIS C AB TEST: CPT

## 2023-03-28 PROCEDURE — 84443 ASSAY THYROID STIM HORMONE: CPT

## 2023-03-28 PROCEDURE — 87389 HIV-1 AG W/HIV-1&-2 AB AG IA: CPT

## 2023-03-28 PROCEDURE — 80053 COMPREHEN METABOLIC PANEL: CPT

## 2023-03-28 PROCEDURE — 85025 COMPLETE CBC W/AUTO DIFF WBC: CPT

## 2023-03-28 PROCEDURE — 80061 LIPID PANEL: CPT

## 2023-03-28 PROCEDURE — 36415 COLL VENOUS BLD VENIPUNCTURE: CPT

## 2023-03-28 PROCEDURE — 83036 HEMOGLOBIN GLYCOSYLATED A1C: CPT

## 2023-04-04 ENCOUNTER — OFFICE VISIT (OUTPATIENT)
Dept: FAMILY MEDICINE | Facility: CLINIC | Age: 53
End: 2023-04-04
Payer: COMMERCIAL

## 2023-04-04 VITALS
HEIGHT: 61 IN | HEART RATE: 70 BPM | TEMPERATURE: 98 F | SYSTOLIC BLOOD PRESSURE: 114 MMHG | BODY MASS INDEX: 29.17 KG/M2 | DIASTOLIC BLOOD PRESSURE: 78 MMHG | WEIGHT: 154.5 LBS | OXYGEN SATURATION: 99 % | RESPIRATION RATE: 18 BRPM

## 2023-04-04 DIAGNOSIS — E03.9 HYPOTHYROIDISM, UNSPECIFIED TYPE: ICD-10-CM

## 2023-04-04 DIAGNOSIS — D70.9 NEUTROPENIA, UNSPECIFIED TYPE: ICD-10-CM

## 2023-04-04 DIAGNOSIS — Z00.00 WELLNESS EXAMINATION: Primary | ICD-10-CM

## 2023-04-04 DIAGNOSIS — L71.9 ROSACEA: ICD-10-CM

## 2023-04-04 DIAGNOSIS — Z12.31 ENCOUNTER FOR SCREENING MAMMOGRAM FOR BREAST CANCER: ICD-10-CM

## 2023-04-04 PROCEDURE — 1160F PR REVIEW ALL MEDS BY PRESCRIBER/CLIN PHARMACIST DOCUMENTED: ICD-10-PCS | Mod: CPTII,,, | Performed by: INTERNAL MEDICINE

## 2023-04-04 PROCEDURE — 3008F PR BODY MASS INDEX (BMI) DOCUMENTED: ICD-10-PCS | Mod: CPTII,,, | Performed by: INTERNAL MEDICINE

## 2023-04-04 PROCEDURE — 3078F DIAST BP <80 MM HG: CPT | Mod: CPTII,,, | Performed by: INTERNAL MEDICINE

## 2023-04-04 PROCEDURE — 99396 PR PREVENTIVE VISIT,EST,40-64: ICD-10-PCS | Mod: ,,, | Performed by: INTERNAL MEDICINE

## 2023-04-04 PROCEDURE — 99396 PREV VISIT EST AGE 40-64: CPT | Mod: ,,, | Performed by: INTERNAL MEDICINE

## 2023-04-04 PROCEDURE — 3074F SYST BP LT 130 MM HG: CPT | Mod: CPTII,,, | Performed by: INTERNAL MEDICINE

## 2023-04-04 PROCEDURE — 3008F BODY MASS INDEX DOCD: CPT | Mod: CPTII,,, | Performed by: INTERNAL MEDICINE

## 2023-04-04 PROCEDURE — 1160F RVW MEDS BY RX/DR IN RCRD: CPT | Mod: CPTII,,, | Performed by: INTERNAL MEDICINE

## 2023-04-04 PROCEDURE — 1159F PR MEDICATION LIST DOCUMENTED IN MEDICAL RECORD: ICD-10-PCS | Mod: CPTII,,, | Performed by: INTERNAL MEDICINE

## 2023-04-04 PROCEDURE — 1159F MED LIST DOCD IN RCRD: CPT | Mod: CPTII,,, | Performed by: INTERNAL MEDICINE

## 2023-04-04 PROCEDURE — 3074F PR MOST RECENT SYSTOLIC BLOOD PRESSURE < 130 MM HG: ICD-10-PCS | Mod: CPTII,,, | Performed by: INTERNAL MEDICINE

## 2023-04-04 PROCEDURE — 3078F PR MOST RECENT DIASTOLIC BLOOD PRESSURE < 80 MM HG: ICD-10-PCS | Mod: CPTII,,, | Performed by: INTERNAL MEDICINE

## 2023-04-04 RX ORDER — LIOTHYRONINE SODIUM 5 UG/1
15 TABLET ORAL DAILY
COMMUNITY
Start: 2023-03-12

## 2023-04-04 RX ORDER — DICLOFENAC SODIUM 10 MG/G
2 GEL TOPICAL 4 TIMES DAILY
Qty: 200 G | Refills: 2 | Status: SHIPPED | OUTPATIENT
Start: 2023-04-04

## 2023-04-04 NOTE — PROGRESS NOTES
"Subjective:      Patient ID: Glenda Franklin is a 52 y.o. female.    Chief Complaint: Annual Exam    HPI  Wellness visit    Doing well today  Still with fatigue after work, not exercising after work which used to help  She has lost weight with dietary modifications, happy about this change        Chronic Medical Conditions:  thyroid nodule - Dr. Mendosa following, she has recently been provided cytomel and also cut on the levothyroxine to where she takes half dose on weekends, TSH is low today  hypothyroidism: stable, sx controlled   chronic fatigue: unsure of etiology; sounds like GEMINI has a big part of this along with stress; her son has crohn's disease and she finds herself so focused on his health or with her work that she does not take time for herself  Family history of breast cancer: genetic counseling completed, she is BRCA negative      GYN: Dr. 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  Objective:     Vitals:    04/04/23 0912   BP: 114/78   BP Location: Right arm   Patient Position: Sitting   BP Method: Large (Automatic)   Pulse: 70   Resp: 18   Temp: 97.6 °F (36.4 °C)   TempSrc: Temporal   SpO2: 99%   Weight: 70.1 kg (154 lb 8 oz)   Height: 5' 1" (1.549 m)     Physical Exam  Assessment/Plan:     1. Wellness " examination  Fasting labs reviewed  TSH is low, repeat in 1 month  WBC slightly low, asx, repeat in 1 month  She has scheduled mammogram, pap smear and her colonoscopy for later this year  2. Encounter for screening mammogram for breast cancer  Scheduled   3. Hypothyroidism, unspecified type  -     TSH; Future; Expected date: 05/04/2023  -     T4, Free; Future; Expected date: 05/04/2023  Repeat testing in 1 month  If still low, send results to her endocrine MD DR. Mendosa for adjustment recs  4. Neutropenia, unspecified type  -     CBC Auto Differential; Future; Expected date: 05/04/2023    5. Rosacea  -     Ambulatory referral/consult to Dermatology; Future; Expected date: 04/11/2023    Other orders  -     diclofenac sodium (VOLTAREN) 1 % Gel; Apply 2 g topically 4 (four) times daily.  Dispense: 200 g; Refill: 2       Follow up in about 6 months (around 10/4/2023) for Follow Up - Chronic Conditions.    This includes face to face time and non-face to face time preparing to see the patient (eg, review of tests), obtaining and/or reviewing separately obtained history, documenting clinical information in the electronic or other health record, independently interpreting results and communicating results to the patient/family/caregiver, or care coordinator.

## 2023-04-18 LAB — BCS RECOMMENDATION EXT: NORMAL

## 2023-04-20 ENCOUNTER — DOCUMENTATION ONLY (OUTPATIENT)
Dept: FAMILY MEDICINE | Facility: CLINIC | Age: 53
End: 2023-04-20
Payer: COMMERCIAL

## 2023-05-01 ENCOUNTER — PATIENT MESSAGE (OUTPATIENT)
Dept: ADMINISTRATIVE | Facility: HOSPITAL | Age: 53
End: 2023-05-01
Payer: COMMERCIAL

## 2023-05-09 ENCOUNTER — LAB VISIT (OUTPATIENT)
Dept: LAB | Facility: HOSPITAL | Age: 53
End: 2023-05-09
Attending: INTERNAL MEDICINE
Payer: COMMERCIAL

## 2023-05-09 DIAGNOSIS — D70.9 NEUTROPENIA, UNSPECIFIED TYPE: ICD-10-CM

## 2023-05-09 DIAGNOSIS — E03.9 HYPOTHYROIDISM, UNSPECIFIED TYPE: ICD-10-CM

## 2023-05-09 LAB
BASOPHILS # BLD AUTO: 0.03 X10(3)/MCL
BASOPHILS NFR BLD AUTO: 0.5 %
EOSINOPHIL # BLD AUTO: 0.17 X10(3)/MCL (ref 0–0.9)
EOSINOPHIL NFR BLD AUTO: 3.1 %
ERYTHROCYTE [DISTWIDTH] IN BLOOD BY AUTOMATED COUNT: 12.4 % (ref 11.5–17)
HCT VFR BLD AUTO: 46.1 % (ref 37–47)
HGB BLD-MCNC: 15.1 G/DL (ref 12–16)
IMM GRANULOCYTES # BLD AUTO: 0.01 X10(3)/MCL (ref 0–0.04)
IMM GRANULOCYTES NFR BLD AUTO: 0.2 %
LYMPHOCYTES # BLD AUTO: 1.82 X10(3)/MCL (ref 0.6–4.6)
LYMPHOCYTES NFR BLD AUTO: 32.8 %
MCH RBC QN AUTO: 29.5 PG (ref 27–31)
MCHC RBC AUTO-ENTMCNC: 32.8 G/DL (ref 33–36)
MCV RBC AUTO: 90.2 FL (ref 80–94)
MONOCYTES # BLD AUTO: 0.44 X10(3)/MCL (ref 0.1–1.3)
MONOCYTES NFR BLD AUTO: 7.9 %
NEUTROPHILS # BLD AUTO: 3.08 X10(3)/MCL (ref 2.1–9.2)
NEUTROPHILS NFR BLD AUTO: 55.5 %
NRBC BLD AUTO-RTO: 0 %
PLATELET # BLD AUTO: 234 X10(3)/MCL (ref 130–400)
PMV BLD AUTO: 9.6 FL (ref 7.4–10.4)
RBC # BLD AUTO: 5.11 X10(6)/MCL (ref 4.2–5.4)
T4 FREE SERPL-MCNC: 1.08 NG/DL (ref 0.7–1.48)
TSH SERPL-ACNC: 0.28 UIU/ML (ref 0.35–4.94)
WBC # SPEC AUTO: 5.55 X10(3)/MCL (ref 4.5–11.5)

## 2023-05-09 PROCEDURE — 84443 ASSAY THYROID STIM HORMONE: CPT

## 2023-05-09 PROCEDURE — 36415 COLL VENOUS BLD VENIPUNCTURE: CPT

## 2023-05-09 PROCEDURE — 84439 ASSAY OF FREE THYROXINE: CPT

## 2023-05-09 PROCEDURE — 85025 COMPLETE CBC W/AUTO DIFF WBC: CPT

## 2023-06-30 LAB — CRC RECOMMENDATION EXT: NORMAL

## 2023-07-11 ENCOUNTER — DOCUMENTATION ONLY (OUTPATIENT)
Dept: ADMINISTRATIVE | Facility: HOSPITAL | Age: 53
End: 2023-07-11
Payer: COMMERCIAL

## 2023-10-17 ENCOUNTER — OFFICE VISIT (OUTPATIENT)
Dept: FAMILY MEDICINE | Facility: CLINIC | Age: 53
End: 2023-10-17
Payer: COMMERCIAL

## 2023-10-17 VITALS
TEMPERATURE: 99 F | WEIGHT: 165.69 LBS | RESPIRATION RATE: 16 BRPM | HEART RATE: 82 BPM | DIASTOLIC BLOOD PRESSURE: 78 MMHG | SYSTOLIC BLOOD PRESSURE: 124 MMHG | BODY MASS INDEX: 31.28 KG/M2 | OXYGEN SATURATION: 98 % | HEIGHT: 61 IN

## 2023-10-17 DIAGNOSIS — G47.33 OSA (OBSTRUCTIVE SLEEP APNEA): ICD-10-CM

## 2023-10-17 DIAGNOSIS — G57.02 PIRIFORMIS SYNDROME OF LEFT SIDE: Primary | ICD-10-CM

## 2023-10-17 DIAGNOSIS — S03.40XS SPRAIN OF TEMPOROMANDIBULAR JOINT, SEQUELA: ICD-10-CM

## 2023-10-17 DIAGNOSIS — E03.9 HYPOTHYROIDISM, UNSPECIFIED TYPE: ICD-10-CM

## 2023-10-17 DIAGNOSIS — F41.9 ANXIETY: ICD-10-CM

## 2023-10-17 DIAGNOSIS — K21.9 GASTROESOPHAGEAL REFLUX DISEASE, UNSPECIFIED WHETHER ESOPHAGITIS PRESENT: ICD-10-CM

## 2023-10-17 DIAGNOSIS — R53.82 CHRONIC FATIGUE: ICD-10-CM

## 2023-10-17 PROBLEM — K63.5 POLYP OF COLON: Status: ACTIVE | Noted: 2023-06-30

## 2023-10-17 PROCEDURE — 3078F PR MOST RECENT DIASTOLIC BLOOD PRESSURE < 80 MM HG: ICD-10-PCS | Mod: CPTII,,, | Performed by: INTERNAL MEDICINE

## 2023-10-17 PROCEDURE — 1159F PR MEDICATION LIST DOCUMENTED IN MEDICAL RECORD: ICD-10-PCS | Mod: CPTII,,, | Performed by: INTERNAL MEDICINE

## 2023-10-17 PROCEDURE — 1159F MED LIST DOCD IN RCRD: CPT | Mod: CPTII,,, | Performed by: INTERNAL MEDICINE

## 2023-10-17 PROCEDURE — 3044F PR MOST RECENT HEMOGLOBIN A1C LEVEL <7.0%: ICD-10-PCS | Mod: CPTII,,, | Performed by: INTERNAL MEDICINE

## 2023-10-17 PROCEDURE — 3074F PR MOST RECENT SYSTOLIC BLOOD PRESSURE < 130 MM HG: ICD-10-PCS | Mod: CPTII,,, | Performed by: INTERNAL MEDICINE

## 2023-10-17 PROCEDURE — 99214 PR OFFICE/OUTPT VISIT, EST, LEVL IV, 30-39 MIN: ICD-10-PCS | Mod: ,,, | Performed by: INTERNAL MEDICINE

## 2023-10-17 PROCEDURE — 99214 OFFICE O/P EST MOD 30 MIN: CPT | Mod: ,,, | Performed by: INTERNAL MEDICINE

## 2023-10-17 PROCEDURE — 3074F SYST BP LT 130 MM HG: CPT | Mod: CPTII,,, | Performed by: INTERNAL MEDICINE

## 2023-10-17 PROCEDURE — 1160F RVW MEDS BY RX/DR IN RCRD: CPT | Mod: CPTII,,, | Performed by: INTERNAL MEDICINE

## 2023-10-17 PROCEDURE — 3078F DIAST BP <80 MM HG: CPT | Mod: CPTII,,, | Performed by: INTERNAL MEDICINE

## 2023-10-17 PROCEDURE — 3008F BODY MASS INDEX DOCD: CPT | Mod: CPTII,,, | Performed by: INTERNAL MEDICINE

## 2023-10-17 PROCEDURE — 3008F PR BODY MASS INDEX (BMI) DOCUMENTED: ICD-10-PCS | Mod: CPTII,,, | Performed by: INTERNAL MEDICINE

## 2023-10-17 PROCEDURE — 3044F HG A1C LEVEL LT 7.0%: CPT | Mod: CPTII,,, | Performed by: INTERNAL MEDICINE

## 2023-10-17 PROCEDURE — 1160F PR REVIEW ALL MEDS BY PRESCRIBER/CLIN PHARMACIST DOCUMENTED: ICD-10-PCS | Mod: CPTII,,, | Performed by: INTERNAL MEDICINE

## 2023-10-17 RX ORDER — PANTOPRAZOLE SODIUM 40 MG/1
40 TABLET, DELAYED RELEASE ORAL DAILY
COMMUNITY
Start: 2023-10-16

## 2023-10-17 RX ORDER — LEVOTHYROXINE SODIUM 50 UG/1
50 TABLET ORAL DAILY
COMMUNITY
Start: 2023-10-16

## 2023-10-17 RX ORDER — CYCLOBENZAPRINE HCL 5 MG
5 TABLET ORAL NIGHTLY PRN
Qty: 30 TABLET | Refills: 0 | Status: SHIPPED | OUTPATIENT
Start: 2023-10-17 | End: 2023-10-27

## 2023-10-17 RX ORDER — METFORMIN HYDROCHLORIDE 500 MG/1
500 TABLET ORAL
Qty: 30 TABLET | Refills: 11 | Status: SHIPPED | OUTPATIENT
Start: 2023-10-17 | End: 2024-10-16

## 2023-10-17 NOTE — PROGRESS NOTES
"Subjective:      Patient ID: Glenda Franklin is a 53 y.o. female.    Chief Complaint: Anxiety    HPI  Last wellness 2023    S/p EGD for esophagitis, s/p dilation  Colonoscopy done with polyp removed  Increase in back pain, left side  Still stretching but no relief  Using nsaid which helps but now with daily use  Has done PT in the past   Also reports increase in TMJ symptoms, more stress, not exercising  [facility] Pt referral needed      Chronic Medical Conditions:  Hashimoto's thyroiditis, thyroid nodule - Dr. Mendosa following  Medication: cytomel 5 mcg (3 tabs daily) + levothyroxine 50 mcg    chronic fatigue: unsure of etiology; sounds like GEMINI has a big part of this along with stress; her son has crohn's disease and she finds herself so focused on his health or with her work that she does not take time for herself    Rosacea:  -seen by dermatology, Dr. Lerma  Provided Rx minocycline     Family history of breast cancer: genetic counseling completed, she is BRCA negative      GYN: Dr. Ann  last pap smear: 2022, negative for malignancy  Mammogram: [facility] 23 negative for malignancy  Colon Cancer Screenin2023 , repeat in 5 years ; Dr. 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   Musculoskeletal:  Positive for back pain.       Objective:     Vitals:    10/17/23 1136   BP: 124/78   BP Location: Left arm   Patient Position: Sitting   BP Method: Large (Automatic)   Pulse: 82   Resp: 16   Temp: 98.7 °F (37.1 °C)   TempSrc: Temporal   SpO2: 98%   Weight: 75.2 kg (165 lb 11.2 oz)   Height: 5' 1" (1.549 m) "     Physical Exam  Vitals and nursing note reviewed.   Constitutional:       General: She is not in acute distress.     Appearance: She is well-developed. She is not diaphoretic.   HENT:      Head: Normocephalic and atraumatic.   Eyes:      General:         Right eye: No discharge.         Left eye: No discharge.      Conjunctiva/sclera: Conjunctivae normal.      Pupils: Pupils are equal, round, and reactive to light.   Neck:      Thyroid: No thyromegaly.   Cardiovascular:      Rate and Rhythm: Normal rate and regular rhythm.      Heart sounds: Normal heart sounds. No murmur heard.  Pulmonary:      Effort: Pulmonary effort is normal. No respiratory distress.      Breath sounds: Normal breath sounds. No wheezing or rales.   Abdominal:      General: There is no distension.      Palpations: Abdomen is soft.      Tenderness: There is no abdominal tenderness.   Musculoskeletal:      Cervical back: Normal range of motion and neck supple.   Lymphadenopathy:      Cervical: No cervical adenopathy.   Skin:     General: Skin is warm and dry.   Neurological:      Mental Status: She is alert and oriented to person, place, and time.   Psychiatric:         Mood and Affect: Mood normal.         Behavior: Behavior normal.       Assessment/Plan:     1. Piriformis syndrome of left side  -     Ambulatory referral/consult to Physical/Occupational Therapy; Future; Expected date: 10/24/2023  Patient with increase in lower back pain  Referral to PT now  Avoid Daily NSAID use, rotate with tylenol PRN pain  Rx for flexeril provided  If no improvement, will work on MRI lumbar spine  2. Sprain of temporomandibular joint, sequela  -     Ambulatory referral/consult to Physical/Occupational Therapy; Future; Expected date: 10/24/2023  PT referral  Okay to use flexeril 5 mg po QHS prn pain  3. Hypothyroidism, unspecified type  Stable [facility]  F/u with endocrine  4. Chronic fatigue  Patient with worse fatigue than before  She is working with endocrine  on her thyroid levels  Continue to work on weight loss efforts, exercise and thyroid correction  Rx for metformin provided she has history of PCOS and wants jump start on weight loss  5. Anxiety  Unchanged  Lifestyle mod reviewed   6. Gastroesophageal reflux disease, unspecified whether esophagitis present  Stable continue PPI  7. GEMINI (obstructive sleep apnea)  Unchanged    Other orders  -     metFORMIN (GLUCOPHAGE) 500 MG tablet; Take 1 tablet (500 mg total) by mouth daily with breakfast.  Dispense: 30 tablet; Refill: 11  -     cyclobenzaprine (FLEXERIL) 5 MG tablet; Take 1 tablet (5 mg total) by mouth nightly as needed for Muscle spasms.  Dispense: 30 tablet; Refill: 0       Follow up in about 6 months (around 4/17/2024) for Annual Wellness.    This includes face to face time and non-face to face time preparing to see the patient (eg, review of tests), obtaining and/or reviewing separately obtained history, documenting clinical information in the electronic or other health record, independently interpreting results and communicating results to the patient/family/caregiver, or care coordinator.

## 2023-11-28 ENCOUNTER — LAB VISIT (OUTPATIENT)
Dept: LAB | Facility: HOSPITAL | Age: 53
End: 2023-11-28
Attending: OBSTETRICS & GYNECOLOGY
Payer: COMMERCIAL

## 2023-11-28 DIAGNOSIS — R53.81 DEBILITY: ICD-10-CM

## 2023-11-28 DIAGNOSIS — E34.9 ENDOCRINE DISORDER RELATED TO PUBERTY: Primary | ICD-10-CM

## 2023-11-28 LAB
DEPRECATED CALCIDIOL+CALCIFEROL SERPL-MC: 67.1 NG/ML (ref 30–80)
ESTRADIOL SERPL HS-MCNC: <24 PG/ML
FSH SERPL-ACNC: 61.68 MIU/ML
TESTOST SERPL-MCNC: 22.81 NG/DL (ref 12.4–35.75)

## 2023-11-28 PROCEDURE — 82670 ASSAY OF TOTAL ESTRADIOL: CPT

## 2023-11-28 PROCEDURE — 82306 VITAMIN D 25 HYDROXY: CPT

## 2023-11-28 PROCEDURE — 83001 ASSAY OF GONADOTROPIN (FSH): CPT

## 2023-11-28 PROCEDURE — 84403 ASSAY OF TOTAL TESTOSTERONE: CPT

## 2023-11-28 PROCEDURE — 36415 COLL VENOUS BLD VENIPUNCTURE: CPT

## 2023-12-05 ENCOUNTER — LAB VISIT (OUTPATIENT)
Dept: LAB | Facility: HOSPITAL | Age: 53
End: 2023-12-05
Attending: PHYSICIAN ASSISTANT
Payer: COMMERCIAL

## 2023-12-05 DIAGNOSIS — J30.5 ALLERGIC RHINITIS DUE TO FOOD: Primary | ICD-10-CM

## 2023-12-05 PROCEDURE — 36415 COLL VENOUS BLD VENIPUNCTURE: CPT

## 2023-12-05 PROCEDURE — 86003 ALLG SPEC IGE CRUDE XTRC EA: CPT | Mod: 91

## 2023-12-05 PROCEDURE — 86003 ALLG SPEC IGE CRUDE XTRC EA: CPT

## 2023-12-07 LAB
ALLERGEN ALMOND IGE (OLG): <0.1 KUA/L
ALLERGEN APPLE IGE (OLG): <0.1 KUA/L
ALLERGEN BANANA IGE (OLG): <0.1 KUA/L
ALLERGEN BEEF IGE (OLG): <0.1 KUA/L
ALLERGEN CASHEW NUT IGE (OLG): <0.1 KUA/L
ALLERGEN CHICKEN IGE (OLG): <0.1 KUA/L
ALLERGEN CODFISH IGE (OLG): <0.1 KUA/L
ALLERGEN CORN IGE (OLG): <0.1 KUA/L
ALLERGEN CRAB IGE (OLG): <0.1 KUA/L
ALLERGEN EGG WHOLE IGE (OLG): <0.1 KUA/L
ALLERGEN LOBSTER IGE (OLG): <0.1 KUA/L
ALLERGEN MILK IGE (OLG): <0.1 KUA/L
ALLERGEN PEANUT IGE (OLG): <0.1 KUA/L
ALLERGEN PECAN NUT IGE (OLG): <0.1 KUA/L
ALLERGEN RICE IGE (OLG): <0.1 KUA/L
ALLERGEN SALMON IGE (OLG): <0.1 KUA/L
ALLERGEN SHRIMP IGE (OLG): <0.1 KUA/L
ALLERGEN SOY BEAN IGE (OLG): <0.1 KUA/L
ALLERGEN STRAWBERRY IGE (OLG): <0.1 KUA/L
ALLERGEN TUNA IGE (OLG): <0.1 KUA/L
ALLERGEN WALNUT IGE (OLG): <0.1 KUA/L
ALLERGEN WHEAT IGE (OLG): <0.1 KUA/L
Lab: <0.1 KUA/L

## 2023-12-19 LAB — BMD RECOMMENDATION EXT: NORMAL

## 2024-01-23 ENCOUNTER — TELEPHONE (OUTPATIENT)
Dept: FAMILY MEDICINE | Facility: CLINIC | Age: 54
End: 2024-01-23
Payer: COMMERCIAL

## 2024-01-23 NOTE — TELEPHONE ENCOUNTER
----- Message from Kelli Momin sent at 1/23/2024 10:58 AM CST -----  Regarding: call  .Type:  Needs Medical Advice    Who Called: pt  Would the patient rather a call back or a response via [facility]? CB  VITAMIND D LEVEL - request call

## 2024-01-23 NOTE — TELEPHONE ENCOUNTER
I spoke with patient and she stated she saw endocrinology today and they questioned her about her vitamin d level due to her having osteopenia. I advised patient has last vitamin d level was 11.28.23 and level was 67.1. Patient voiced understanding. She requested her results be faxed to [facility]. I advised her to have Dr. Ann send the results since he did order the tests. She voiced understanding.       Patient also given information regarding her next appointment with Dr. Almodovar. [place]

## 2024-04-09 ENCOUNTER — OFFICE VISIT (OUTPATIENT)
Dept: FAMILY MEDICINE | Facility: CLINIC | Age: 54
End: 2024-04-09
Payer: COMMERCIAL

## 2024-04-09 ENCOUNTER — PATIENT OUTREACH (OUTPATIENT)
Dept: ADMINISTRATIVE | Facility: HOSPITAL | Age: 54
End: 2024-04-09
Payer: COMMERCIAL

## 2024-04-09 VITALS
SYSTOLIC BLOOD PRESSURE: 128 MMHG | TEMPERATURE: 99 F | RESPIRATION RATE: 16 BRPM | WEIGHT: 167.19 LBS | HEART RATE: 80 BPM | BODY MASS INDEX: 31.57 KG/M2 | DIASTOLIC BLOOD PRESSURE: 82 MMHG | OXYGEN SATURATION: 99 % | HEIGHT: 61 IN

## 2024-04-09 DIAGNOSIS — Z13.6 ENCOUNTER FOR LIPID SCREENING FOR CARDIOVASCULAR DISEASE: ICD-10-CM

## 2024-04-09 DIAGNOSIS — Z13.220 ENCOUNTER FOR LIPID SCREENING FOR CARDIOVASCULAR DISEASE: ICD-10-CM

## 2024-04-09 DIAGNOSIS — E03.9 HYPOTHYROIDISM, UNSPECIFIED TYPE: ICD-10-CM

## 2024-04-09 DIAGNOSIS — Z00.00 WELLNESS EXAMINATION: Primary | ICD-10-CM

## 2024-04-09 DIAGNOSIS — R73.01 ELEVATED FASTING GLUCOSE: ICD-10-CM

## 2024-04-09 DIAGNOSIS — R53.82 CHRONIC FATIGUE: ICD-10-CM

## 2024-04-09 DIAGNOSIS — K21.9 GASTROESOPHAGEAL REFLUX DISEASE WITHOUT ESOPHAGITIS: ICD-10-CM

## 2024-04-09 DIAGNOSIS — R42 VERTIGO: ICD-10-CM

## 2024-04-09 PROCEDURE — 1160F RVW MEDS BY RX/DR IN RCRD: CPT | Mod: CPTII,,, | Performed by: INTERNAL MEDICINE

## 2024-04-09 PROCEDURE — 3074F SYST BP LT 130 MM HG: CPT | Mod: CPTII,,, | Performed by: INTERNAL MEDICINE

## 2024-04-09 PROCEDURE — 1159F MED LIST DOCD IN RCRD: CPT | Mod: CPTII,,, | Performed by: INTERNAL MEDICINE

## 2024-04-09 PROCEDURE — 3008F BODY MASS INDEX DOCD: CPT | Mod: CPTII,,, | Performed by: INTERNAL MEDICINE

## 2024-04-09 PROCEDURE — 99396 PREV VISIT EST AGE 40-64: CPT | Mod: ,,, | Performed by: INTERNAL MEDICINE

## 2024-04-09 PROCEDURE — 3079F DIAST BP 80-89 MM HG: CPT | Mod: CPTII,,, | Performed by: INTERNAL MEDICINE

## 2024-04-09 RX ORDER — ESTRADIOL 0.1 MG/G
1 CREAM VAGINAL
COMMUNITY
Start: 2024-03-20

## 2024-04-09 NOTE — PROGRESS NOTES
Subjective:      Patient ID: Glenda Franklin is a 53 y.o. female.    Chief Complaint: Annual Exam    HPI    Wellness  Doing well today overall  Exercising, sleeping well, diet is not restricted    S/p EGD for esophagitis, s/p dilation  Colonoscopy done with polyp removed       Chronic Medical Conditions:  Hashimoto's thyroiditis, thyroid nodule - Dr. Mendosa following  Medication: cytomel 5 mcg (3 tabs daily) + levothyroxine 50 mcg    chronic fatigue: unsure of etiology; sounds like GEMINI has a big part of this along with stress; her son has crohn's disease and she finds herself so focused on his health or with her work that she does not take time for herself     Rosacea:  -seen by dermatology, Dr. Lerma  Provided Rx minocycline      Family history of breast cancer: genetic counseling completed, she is BRCA negative      GYN: Dr. Ann  last pap smear: 2022, negative for malignancy  Mammogram: [facility] 23 negative for malignancy  Mammogram scheduled 2024   Colon Cancer Screenin2023 , repeat in 5 years ; Dr. 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 Negative for chills and fever.   HENT:  Negative for congestion, sinus pressure and sore throat.    Respiratory:  Negative for cough and shortness of breath.    Cardiovascular:  Negative for chest pain and palpitations.   Gastrointestinal:  Negative for abdominal pain and nausea.   Skin:  Negative for rash.   Neurological:  Positive for dizziness.       Objective:     Vitals:    24 1108  "  BP: 128/82   BP Location: Left arm   Patient Position: Sitting   BP Method: Large (Automatic)   Pulse: 80   Resp: 16   Temp: 98.5 °F (36.9 °C)   TempSrc: Temporal   SpO2: 99%   Weight: 75.8 kg (167 lb 3.2 oz)   Height: 5' 1" (1.549 m)     Physical Exam  Vitals and nursing note reviewed.   Constitutional:       General: She is not in acute distress.     Appearance: She is well-developed. She is not diaphoretic.   HENT:      Head: Normocephalic and atraumatic.      Right Ear: Tympanic membrane normal.      Left Ear: Tympanic membrane normal.      Nose: Nose normal.      Mouth/Throat:      Pharynx: No oropharyngeal exudate.   Eyes:      General:         Right eye: No discharge.         Left eye: No discharge.      Conjunctiva/sclera: Conjunctivae normal.      Pupils: Pupils are equal, round, and reactive to light.   Neck:      Thyroid: No thyromegaly.      Comments: One small lymph node palpable below the L ear  No effusion posterior to bilateral TM on ear exam    Cardiovascular:      Rate and Rhythm: Normal rate and regular rhythm.      Heart sounds: Normal heart sounds. No murmur heard.  Pulmonary:      Effort: Pulmonary effort is normal. No respiratory distress.      Breath sounds: Normal breath sounds. No wheezing or rales.   Abdominal:      General: There is no distension.      Palpations: Abdomen is soft.      Tenderness: There is no abdominal tenderness.   Musculoskeletal:      Cervical back: Normal range of motion and neck supple.   Lymphadenopathy:      Cervical: No cervical adenopathy.   Skin:     General: Skin is warm and dry.   Neurological:      Mental Status: She is alert and oriented to person, place, and time.   Psychiatric:         Mood and Affect: Mood normal.         Behavior: Behavior normal.       Assessment/Plan:     1. Wellness examination  -     Comprehensive Metabolic Panel; Future; Expected date: 04/09/2024  -     Lipid Panel; Future; Expected date: 04/09/2024  -     CBC Auto Differential; " Future; Expected date: 04/09/2024  -     Hemoglobin A1C; Future; Expected date: 04/09/2024  -     Urinalysis; Future; Expected date: 04/09/2024  Fasting labs ordered  2. Elevated fasting glucose  -     Comprehensive Metabolic Panel; Future; Expected date: 04/09/2024  -     Lipid Panel; Future; Expected date: 04/09/2024  -     CBC Auto Differential; Future; Expected date: 04/09/2024  -     Hemoglobin A1C; Future; Expected date: 04/09/2024  -     Urinalysis; Future; Expected date: 04/09/2024    3. Encounter for lipid screening for cardiovascular disease  -     Comprehensive Metabolic Panel; Future; Expected date: 04/09/2024  -     Lipid Panel; Future; Expected date: 04/09/2024  -     CBC Auto Differential; Future; Expected date: 04/09/2024  -     Hemoglobin A1C; Future; Expected date: 04/09/2024  -     Urinalysis; Future; Expected date: 04/09/2024    4. Chronic fatigue  Stable continue exercise and healthy diet  5. Gastroesophageal reflux disease without esophagitis  Stable  Okay to reduce protonix to 20 mg po daily  6. Hypothyroidism, unspecified type  Stable [facility], f/u with endocrine  7. Osteopenia  Ordered and completed thru her GYN Dr. Ann- request records to f/u on this, may need to have her discuss with her endocrine, Deondre, regarding results and plan of care given her age  8. Vertigo  Sx are suggestive of BPPV  At this time, recommend to try at home epley maneuver  Patient instruction printed and provided to patient  If sx fail to improve please notify me and we can do additional testing       Follow up in about 6 months (around 10/9/2024) for Follow Up - Chronic Conditions.    This includes face to face time and non-face to face time preparing to see the patient (eg, review of tests), obtaining and/or reviewing separately obtained history, documenting clinical information in the electronic or other health record, independently interpreting results and communicating results to the patient/family/caregiver,  or care coordinator.

## 2024-04-09 NOTE — PROGRESS NOTES
Health Maintenance Topic(s) Outreach Outcomes & Actions Taken:    Osteoporosis Screening - Outreach Outcomes & Actions Taken  : External Records Uploaded, Care Team Updated, & History Updated if Applicable       Additional Notes:  Bone Density 12/19/23

## 2024-04-16 ENCOUNTER — LAB VISIT (OUTPATIENT)
Dept: LAB | Facility: HOSPITAL | Age: 54
End: 2024-04-16
Attending: INTERNAL MEDICINE
Payer: COMMERCIAL

## 2024-04-16 DIAGNOSIS — Z00.00 WELLNESS EXAMINATION: ICD-10-CM

## 2024-04-16 DIAGNOSIS — R73.01 ELEVATED FASTING GLUCOSE: ICD-10-CM

## 2024-04-16 DIAGNOSIS — Z13.6 ENCOUNTER FOR LIPID SCREENING FOR CARDIOVASCULAR DISEASE: ICD-10-CM

## 2024-04-16 DIAGNOSIS — Z13.220 ENCOUNTER FOR LIPID SCREENING FOR CARDIOVASCULAR DISEASE: ICD-10-CM

## 2024-04-16 LAB
ALBUMIN SERPL-MCNC: 4.1 G/DL (ref 3.5–5)
ALBUMIN/GLOB SERPL: 1.5 RATIO (ref 1.1–2)
ALP SERPL-CCNC: 60 UNIT/L (ref 40–150)
ALT SERPL-CCNC: 33 UNIT/L (ref 0–55)
APPEARANCE UR: ABNORMAL
AST SERPL-CCNC: 20 UNIT/L (ref 5–34)
BASOPHILS # BLD AUTO: 0.01 X10(3)/MCL
BASOPHILS NFR BLD AUTO: 0.3 %
BILIRUB SERPL-MCNC: 0.5 MG/DL
BILIRUB UR QL STRIP.AUTO: NEGATIVE
BUN SERPL-MCNC: 17.7 MG/DL (ref 9.8–20.1)
CALCIUM SERPL-MCNC: 9.6 MG/DL (ref 8.4–10.2)
CHLORIDE SERPL-SCNC: 108 MMOL/L (ref 98–107)
CHOLEST SERPL-MCNC: 205 MG/DL
CHOLEST/HDLC SERPL: 4 {RATIO} (ref 0–5)
CO2 SERPL-SCNC: 28 MMOL/L (ref 22–29)
COLOR UR AUTO: ABNORMAL
CREAT SERPL-MCNC: 0.87 MG/DL (ref 0.55–1.02)
EOSINOPHIL # BLD AUTO: 0.12 X10(3)/MCL (ref 0–0.9)
EOSINOPHIL NFR BLD AUTO: 3.4 %
ERYTHROCYTE [DISTWIDTH] IN BLOOD BY AUTOMATED COUNT: 12.5 % (ref 11.5–17)
EST. AVERAGE GLUCOSE BLD GHB EST-MCNC: 99.7 MG/DL
GFR SERPLBLD CREATININE-BSD FMLA CKD-EPI: >60 MLS/MIN/1.73/M2
GLOBULIN SER-MCNC: 2.8 GM/DL (ref 2.4–3.5)
GLUCOSE SERPL-MCNC: 109 MG/DL (ref 74–100)
GLUCOSE UR QL STRIP.AUTO: NEGATIVE
HBA1C MFR BLD: 5.1 %
HCT VFR BLD AUTO: 45.1 % (ref 37–47)
HDLC SERPL-MCNC: 53 MG/DL (ref 35–60)
HGB BLD-MCNC: 15.3 G/DL (ref 12–16)
IMM GRANULOCYTES # BLD AUTO: 0.01 X10(3)/MCL (ref 0–0.04)
IMM GRANULOCYTES NFR BLD AUTO: 0.3 %
KETONES UR QL STRIP.AUTO: NEGATIVE
LDLC SERPL CALC-MCNC: 132 MG/DL (ref 50–140)
LEUKOCYTE ESTERASE UR QL STRIP.AUTO: NEGATIVE
LYMPHOCYTES # BLD AUTO: 1.44 X10(3)/MCL (ref 0.6–4.6)
LYMPHOCYTES NFR BLD AUTO: 40.8 %
MCH RBC QN AUTO: 29.7 PG (ref 27–31)
MCHC RBC AUTO-ENTMCNC: 33.9 G/DL (ref 33–36)
MCV RBC AUTO: 87.6 FL (ref 80–94)
MONOCYTES # BLD AUTO: 0.26 X10(3)/MCL (ref 0.1–1.3)
MONOCYTES NFR BLD AUTO: 7.4 %
NEUTROPHILS # BLD AUTO: 1.69 X10(3)/MCL (ref 2.1–9.2)
NEUTROPHILS NFR BLD AUTO: 47.8 %
NITRITE UR QL STRIP.AUTO: NEGATIVE
NRBC BLD AUTO-RTO: 0 %
PH UR STRIP.AUTO: 7.5 [PH]
PLATELET # BLD AUTO: 219 X10(3)/MCL (ref 130–400)
PMV BLD AUTO: 9.3 FL (ref 7.4–10.4)
POTASSIUM SERPL-SCNC: 4.3 MMOL/L (ref 3.5–5.1)
PROT SERPL-MCNC: 6.9 GM/DL (ref 6.4–8.3)
PROT UR QL STRIP.AUTO: NEGATIVE
RBC # BLD AUTO: 5.15 X10(6)/MCL (ref 4.2–5.4)
RBC UR QL AUTO: NEGATIVE
SODIUM SERPL-SCNC: 142 MMOL/L (ref 136–145)
SP GR UR STRIP.AUTO: 1.01 (ref 1–1.03)
TRIGL SERPL-MCNC: 99 MG/DL (ref 37–140)
UROBILINOGEN UR STRIP-ACNC: 0.2
VLDLC SERPL CALC-MCNC: 20 MG/DL
WBC # SPEC AUTO: 3.53 X10(3)/MCL (ref 4.5–11.5)

## 2024-04-16 PROCEDURE — 80061 LIPID PANEL: CPT

## 2024-04-16 PROCEDURE — 85025 COMPLETE CBC W/AUTO DIFF WBC: CPT

## 2024-04-16 PROCEDURE — 80053 COMPREHEN METABOLIC PANEL: CPT

## 2024-04-16 PROCEDURE — 81003 URINALYSIS AUTO W/O SCOPE: CPT

## 2024-04-16 PROCEDURE — 83036 HEMOGLOBIN GLYCOSYLATED A1C: CPT

## 2024-04-16 PROCEDURE — 36415 COLL VENOUS BLD VENIPUNCTURE: CPT

## 2024-04-22 ENCOUNTER — TELEPHONE (OUTPATIENT)
Dept: FAMILY MEDICINE | Facility: CLINIC | Age: 54
End: 2024-04-22
Payer: COMMERCIAL

## 2024-04-22 DIAGNOSIS — D70.8 OTHER NEUTROPENIA: Primary | ICD-10-CM

## 2024-04-22 NOTE — TELEPHONE ENCOUNTER
----- Message from Kailee Almodovar MD sent at 4/17/2024  5:22 PM CDT -----  Please notify patient labs show:  Normal kidney and liver function  Cholesterol is controlled but did increase slightly- continue to work on healthy nutrition and exercise  Sugar is controlled, no prediabetes or diabetes  No UTI  Her WBC count did decrease again slightly- this has happened to her before- we see this sometimes with illness, stress, sleep deprivation, nutritional deficiency. This has happened to her before as well; if she is agreeable, I would like our hematologist to evaluate if we need any further work up for her. I will place referral if agreeable; if not, we can continue to monitor with repeat labs in 6 mos

## 2024-04-23 ENCOUNTER — PATIENT MESSAGE (OUTPATIENT)
Dept: FAMILY MEDICINE | Facility: CLINIC | Age: 54
End: 2024-04-23
Payer: COMMERCIAL

## 2024-04-23 NOTE — TELEPHONE ENCOUNTER
I have signed for the following orders AND/OR meds. Please notify the patient and ask the patient to schedule the testing and/or information about any medications that were sent.         Orders Placed This Encounter   Procedures    Ambulatory referral/consult to Hematology / Oncology     Standing Status:   Future     Standing Expiration Date:   5/23/2025     Referral Priority:   Routine     Referral Type:   Consultation     Referral Reason:   Specialty Services Required     Requested Specialty:   Hematology and Oncology     Number of Visits Requested:   1

## 2024-04-29 ENCOUNTER — PATIENT MESSAGE (OUTPATIENT)
Dept: HEMATOLOGY/ONCOLOGY | Facility: CLINIC | Age: 54
End: 2024-04-29
Payer: COMMERCIAL

## 2024-05-06 NOTE — PROGRESS NOTES
Subjective:        PATIENT: Glenda Franklin  MRN: [id number]  DATE: 2024    PCP: Kailee Almodovar MD   Referring Provider : Kailee Almodovar MD  [address] [place] [address] [address]  [place] [address]     Chief Complaint: No chief complaint on file.          2024  HPI  This is a 53-year-old female sent by her primary care because of neutropenia.  On recent CBC this patient had a white count of 3.5 with an ANC of 1700.  She had a normal hemoglobin normal hematocrit normal platelet count .  Other routine blood work showed normal total protein normal albumin normal bilirubin transaminases normal creatinine.  Plan blood tests from 2023 showed negative HIV negative hepatitis.  This patient has had abnormal TSH is in the past.  CBC from  was normal.    In 2023 this patient had a white count of 3.6 but then in May of 2023 and increased back to 5.5 and then dropped to 3.53 and that is what prompted primary care to send her over here    Past Medical History:   Diagnosis Date    Anxiety     Hashimoto's thyroiditis     Hypothyroidism     Personal history of colonic polyps 2023    Dereck Parks MD      .  Past Surgical History:   Procedure Laterality Date    BUNIONECTOMY       SECTION      x2  &       .  Family History   Problem Relation Name Age of Onset    Cancer Mother        Social History     Socioeconomic History    Marital status:    Tobacco Use    Smoking status: Never    Smokeless tobacco: Never   Substance and Sexual Activity    Alcohol use: Not Currently    Drug use: Never      .  Review of patient's allergies indicates:   Allergen Reactions    Codeine      Other reaction(s): Unknown  Other reaction(s): Unknown (qualifier value)      Iodine         Current Outpatient Medications:     albuterol (VENTOLIN HFA) 90 mcg/actuation inhaler, 1 puff by mouth once a day as needed, Disp: , Rfl:     ascorbic acid, vitamin C, (VITAMIN C) 1000 MG tablet, Take 1,000 mg  by mouth once daily., Disp: , Rfl:     calcium carbonate-vitamin D3 500 mg-10 mcg (400 unit) Tab, Take by mouth once., Disp: , Rfl:     cholecalciferol, vitamin D3, (VITAMIN D3) 50 mcg (2,000 unit) Cap capsule, 0, Disp: , Rfl:     COLLAGEN MISC, by Misc.(Non-Drug; Combo Route) route., Disp: , Rfl:     cyanocobalamin, vitamin B-12, 2,500 mcg Tab, 0, Disp: , Rfl:     cycloSPORINE (RESTASIS) 0.05 % ophthalmic emulsion, 1 drop 2 (two) times daily., Disp: , Rfl:     diclofenac sodium (VOLTAREN) 1 % Gel, Apply 2 g topically 4 (four) times daily., Disp: 200 g, Rfl: 2    folic acid (FOLVITE) 1 MG tablet, Take 1,000 mcg by mouth once daily., Disp: , Rfl:      mg tablet, Take 800 mg by mouth as needed., Disp: , Rfl:     Lactobac no.41/Bifidobact no.7 (PROBIOTIC-10 ORAL), Take by mouth., Disp: , Rfl:     levothyroxine (SYNTHROID) 50 MCG tablet, Take 50 mcg by mouth once daily., Disp: , Rfl:     liothyronine (CYTOMEL) 5 MCG Tab, Take 15 mcg by mouth once daily. 1 tab by mouth Tues, Thurs, Sat, Sun & 2 tabs by mouth Mon, Wed, Fri, Disp: , Rfl:     metFORMIN (GLUCOPHAGE) 500 MG tablet, Take 1 tablet (500 mg total) by mouth daily with breakfast., Disp: 30 tablet, Rfl: 11    metronidazole 0.75% (METROCREAM) 0.75 % Crea, APPLY TO FACE FOR ROSACEA ONCE OR TWICE DAILY, Disp: , Rfl:     montelukast (SINGULAIR) 10 mg tablet, TAKE ONE TABLET BY MOUTH DAILY, Disp: 30 tablet, Rfl: 11    multivit-minerals/folic acid (CENTRUM ADULTS ORAL), Take by mouth., Disp: , Rfl:     omega-3 fatty acids/fish oil (FISH OIL-OMEGA-3 FATTY ACIDS) 300-1,000 mg capsule, Take by mouth once daily., Disp: , Rfl:     pantoprazole (PROTONIX) 40 MG tablet, Take 40 mg by mouth once daily., Disp: , Rfl:     PREVIDENT 5000 PLUS 1.1 % Crea, Take by mouth every evening., Disp: , Rfl:     PROGESTERONE, BULK, MISC, by Misc.(Non-Drug; Combo Route) route., Disp: , Rfl:     psyllium (HYDROCIL) packet, Take 1 packet by mouth once daily., Disp: , Rfl:     TURMERIC ORAL,  Take by mouth., Disp: , Rfl:     estradioL (ESTRACE) 0.01 % (0.1 mg/gram) vaginal cream, Place 1 g vaginally twice a week., Disp: , Rfl:     ketorolac (TORADOL) 10 mg tablet, Take 10 mg by mouth 4 (four) times daily as needed., Disp: , Rfl:    Review of Systems   Constitutional:  Negative for appetite change and unexpected weight change.   HENT:  Negative for mouth sores.    Eyes:  Negative for visual disturbance.   Respiratory:  Negative for cough and shortness of breath.    Cardiovascular:  Negative for chest pain.   Gastrointestinal:  Negative for abdominal pain and diarrhea.   Genitourinary:  Negative for frequency.   Musculoskeletal:  Negative for back pain.   Skin:  Negative for rash.   Neurological:  Negative for headaches.   Hematological:  Positive for adenopathy.   Psychiatric/Behavioral:  The patient is not nervous/anxious.           Objective:     Vitals:    05/07/24 1331   BP: 116/79   Pulse: 73   Temp: 98 °F (36.7 °C)         Physical Exam  Vitals reviewed.   Constitutional:       General: She is awake.      Appearance: Normal appearance.   HENT:      Head: Normocephalic and atraumatic.      Mouth/Throat:      Mouth: Mucous membranes are moist.      Pharynx: Oropharynx is clear.   Eyes:      Extraocular Movements: Extraocular movements intact.      Conjunctiva/sclera: Conjunctivae normal.      Pupils: Pupils are equal, round, and reactive to light.   Cardiovascular:      Rate and Rhythm: Normal rate and regular rhythm.      Pulses: Normal pulses.      Heart sounds: Normal heart sounds.   Pulmonary:      Effort: Pulmonary effort is normal.      Breath sounds: Normal breath sounds and air entry.   Abdominal:      General: Abdomen is flat. Bowel sounds are normal.      Palpations: Abdomen is soft.      Tenderness: There is no abdominal tenderness.   Musculoskeletal:      Cervical back: Normal range of motion.      Right lower leg: No edema.      Left lower leg: No edema.   Lymphadenopathy:      Cervical:  No cervical adenopathy.      Upper Body:      Right upper body: No axillary adenopathy.      Left upper body: No axillary adenopathy.      Lower Body: No right inguinal adenopathy. No left inguinal adenopathy.   Skin:     General: Skin is warm and dry.   Neurological:      General: No focal deficit present.      Mental Status: She is alert and oriented to person, place, and time. Mental status is at baseline.   Psychiatric:         Attention and Perception: Attention normal.         Mood and Affect: Mood and affect normal.         Behavior: Behavior is cooperative.         ECOG SCORE            .Lab Review:  Results for orders placed or performed in visit on 04/16/24   Comprehensive Metabolic Panel   Result Value Ref Range    Sodium Level 142 136 - 145 mmol/L    Potassium Level 4.3 3.5 - 5.1 mmol/L    Chloride 108 (H) 98 - 107 mmol/L    Carbon Dioxide 28 22 - 29 mmol/L    Glucose Level 109 (H) 74 - 100 mg/dL    Blood Urea Nitrogen 17.7 9.8 - 20.1 mg/dL    Creatinine 0.87 0.55 - 1.02 mg/dL    Calcium Level Total 9.6 8.4 - 10.2 mg/dL    Protein Total 6.9 6.4 - 8.3 gm/dL    Albumin Level 4.1 3.5 - 5.0 g/dL    Globulin 2.8 2.4 - 3.5 gm/dL    Albumin/Globulin Ratio 1.5 1.1 - 2.0 ratio    Bilirubin Total 0.5 <=1.5 mg/dL    Alkaline Phosphatase 60 40 - 150 unit/L    Alanine Aminotransferase 33 0 - 55 unit/L    Aspartate Aminotransferase 20 5 - 34 unit/L    eGFR >60 mls/min/1.73/m2   Lipid Panel   Result Value Ref Range    Cholesterol Total 205 (H) <=200 mg/dL    HDL Cholesterol 53 35 - 60 mg/dL    Triglyceride 99 37 - 140 mg/dL    Cholesterol/HDL Ratio 4 0 - 5    Very Low Density Lipoprotein 20     LDL Cholesterol 132.00 50.00 - 140.00 mg/dL   Hemoglobin A1C   Result Value Ref Range    Hemoglobin A1c 5.1 <=7.0 %    Estimated Average Glucose 99.7 mg/dL   Urinalysis   Result Value Ref Range    Color, UA Straw Yellow, Light-Yellow, Dark Yellow, Hilda, Straw    Appearance, UA Cloudy (A) Clear    Specific Freedom, UA 1.015  1.005 - 1.030    pH, UA 7.5 5.0 - 8.5    Protein, UA Negative Negative    Glucose, UA Negative Negative, Normal    Ketones, UA Negative Negative    Blood, UA Negative Negative    Bilirubin, UA Negative Negative    Urobilinogen, UA 0.2 0.2, 1.0, Normal    Nitrites, UA Negative Negative    Leukocyte Esterase, UA Negative Negative   CBC with Differential   Result Value Ref Range    WBC 3.53 (L) 4.50 - 11.50 x10(3)/mcL    RBC 5.15 4.20 - 5.40 x10(6)/mcL    Hgb 15.3 12.0 - 16.0 g/dL    Hct 45.1 37.0 - 47.0 %    MCV 87.6 80.0 - 94.0 fL    MCH 29.7 27.0 - 31.0 pg    MCHC 33.9 33.0 - 36.0 g/dL    RDW 12.5 11.5 - 17.0 %    Platelet 219 130 - 400 x10(3)/mcL    MPV 9.3 7.4 - 10.4 fL    Neut % 47.8 %    Lymph % 40.8 %    Mono % 7.4 %    Eos % 3.4 %    Basophil % 0.3 %    Lymph # 1.44 0.6 - 4.6 x10(3)/mcL    Neut # 1.69 (L) 2.1 - 9.2 x10(3)/mcL    Mono # 0.26 0.1 - 1.3 x10(3)/mcL    Eos # 0.12 0 - 0.9 x10(3)/mcL    Baso # 0.01 <=0.2 x10(3)/mcL    IG# 0.01 0 - 0.04 x10(3)/mcL    IG% 0.3 %    NRBC% 0.0 %      Assessment/Plan:   Leukopenia       This patient has a very mild leukopenia.  They are technically not neutropenic as her ANC is greater than 1500.    Would check a CBC, review of peripheral smear B12 and folate methylmalonic acid (especially with her history of metformin.    Adjustment of her thyroid medication can also cause this  But would not adjust any medications unless her ANC is less than a 1000    I think this patient can be safely followed.     All patients, regardless of the level of absolute neutrophil count (ANC), with findings of sepsis, hemodynamic instability, respiratory compromise, or other clinical emergencies require immediate hospitalization and urgent evaluation.   ?Patients with ANC <500 cells/microL or worrisome findings on blood smear (eg, leukemic blasts, schistocytes) should be evaluated immediately and often require hospitalization.   ?Asymptomatic patients with moderate neutropenia (ie, ?500 to  <1000 neutrophils/microL) and no worrisome findings on blood smear should have a repeat CBC and differential count within one to two weeks,  ?Asymptomatic patients with ANC >1000 cells/microL and no worrisome findings on blood smear should have repeat CBC/differential , followed by outpatient evaluation if neutropenia persists.    I think she should decrease her use of supplements.  And continue to follow her primary care  She will follow-up with her sister to see if there is any other genetic positive testing.  As her sister is BRCA2 positive.  She is  BRCA negative.  She does have another family member with male breast cancer     Follow up in about 6 weeks (around 6/18/2024) for With ME.   Orders Placed This Encounter    CBC Auto Differential    Path Review, Peripheral Smear    Vitamin B12    Folate    Methylmalonic Acid, Serum          Yahir Henderson MD    Addendum:  Lab Review:  Results for orders placed or performed in visit on 05/07/24   Path Review, Peripheral Smear   Result Value Ref Range    Peripheral Smear Evaluation       - Within normal limits.    Marcus Diaz M.D.    Vitamin B12   Result Value Ref Range    Vitamin B12 >2,000 (H) 213 - 816 pg/mL   Folate   Result Value Ref Range    Folate Level 16.0 7.0 - 31.4 ng/mL   Methylmalonic Acid, Serum   Result Value Ref Range    Methylmalonic Acid, QN 0.09 <=0.40 nmol/mL   CBC with Differential   Result Value Ref Range    WBC 4.85 4.50 - 11.50 x10(3)/mcL    RBC 4.94 4.20 - 5.40 x10(6)/mcL    Hgb 14.6 12.0 - 16.0 g/dL    Hct 43.7 37.0 - 47.0 %    MCV 88.5 80.0 - 94.0 fL    MCH 29.6 27.0 - 31.0 pg    MCHC 33.4 33.0 - 36.0 g/dL    RDW 12.3 11.5 - 17.0 %    Platelet 225 130 - 400 x10(3)/mcL    MPV 9.2 7.4 - 10.4 fL    Neut % 48.3 %    Lymph % 41.4 %    Mono % 8.5 %    Eos % 1.6 %    Basophil % 0.2 %    Lymph # 2.01 0.6 - 4.6 x10(3)/mcL    Neut # 2.34 2.1 - 9.2 x10(3)/mcL    Mono # 0.41 0.1 - 1.3 x10(3)/mcL    Eos # 0.08 0 - 0.9 x10(3)/mcL    Baso # 0.01  <=0.2 x10(3)/mcL    IG# 0.00 0 - 0.04 x10(3)/mcL    IG% 0.0 %      Will d/c from clinic NO need for f/u

## 2024-05-07 ENCOUNTER — OFFICE VISIT (OUTPATIENT)
Dept: HEMATOLOGY/ONCOLOGY | Facility: CLINIC | Age: 54
End: 2024-05-07
Payer: COMMERCIAL

## 2024-05-07 VITALS
OXYGEN SATURATION: 100 % | TEMPERATURE: 98 F | WEIGHT: 166.88 LBS | HEART RATE: 73 BPM | BODY MASS INDEX: 31.51 KG/M2 | SYSTOLIC BLOOD PRESSURE: 116 MMHG | DIASTOLIC BLOOD PRESSURE: 79 MMHG | HEIGHT: 61 IN

## 2024-05-07 DIAGNOSIS — D70.8 OTHER NEUTROPENIA: ICD-10-CM

## 2024-05-07 LAB
BASOPHILS # BLD AUTO: 0.01 X10(3)/MCL
BASOPHILS NFR BLD AUTO: 0.2 %
BCS RECOMMENDATION EXT: NORMAL
EOSINOPHIL # BLD AUTO: 0.08 X10(3)/MCL (ref 0–0.9)
EOSINOPHIL NFR BLD AUTO: 1.6 %
ERYTHROCYTE [DISTWIDTH] IN BLOOD BY AUTOMATED COUNT: 12.3 % (ref 11.5–17)
FOLATE SERPL-MCNC: 16 NG/ML (ref 7–31.4)
HCT VFR BLD AUTO: 43.7 % (ref 37–47)
HGB BLD-MCNC: 14.6 G/DL (ref 12–16)
IMM GRANULOCYTES # BLD AUTO: 0 X10(3)/MCL (ref 0–0.04)
IMM GRANULOCYTES NFR BLD AUTO: 0 %
LYMPHOCYTES # BLD AUTO: 2.01 X10(3)/MCL (ref 0.6–4.6)
LYMPHOCYTES NFR BLD AUTO: 41.4 %
MCH RBC QN AUTO: 29.6 PG (ref 27–31)
MCHC RBC AUTO-ENTMCNC: 33.4 G/DL (ref 33–36)
MCV RBC AUTO: 88.5 FL (ref 80–94)
MONOCYTES # BLD AUTO: 0.41 X10(3)/MCL (ref 0.1–1.3)
MONOCYTES NFR BLD AUTO: 8.5 %
NEUTROPHILS # BLD AUTO: 2.34 X10(3)/MCL (ref 2.1–9.2)
NEUTROPHILS NFR BLD AUTO: 48.3 %
PLATELET # BLD AUTO: 225 X10(3)/MCL (ref 130–400)
PMV BLD AUTO: 9.2 FL (ref 7.4–10.4)
RBC # BLD AUTO: 4.94 X10(6)/MCL (ref 4.2–5.4)
VIT B12 SERPL-MCNC: >2000 PG/ML (ref 213–816)
WBC # SPEC AUTO: 4.85 X10(3)/MCL (ref 4.5–11.5)

## 2024-05-07 PROCEDURE — 99999 PR PBB SHADOW E&M-EST. PATIENT-LVL V: CPT | Mod: PBBFAC,,, | Performed by: INTERNAL MEDICINE

## 2024-05-07 PROCEDURE — 83921 ORGANIC ACID SINGLE QUANT: CPT | Performed by: INTERNAL MEDICINE

## 2024-05-07 PROCEDURE — 3078F DIAST BP <80 MM HG: CPT | Mod: CPTII,S$GLB,, | Performed by: INTERNAL MEDICINE

## 2024-05-07 PROCEDURE — 85025 COMPLETE CBC W/AUTO DIFF WBC: CPT | Performed by: INTERNAL MEDICINE

## 2024-05-07 PROCEDURE — 3044F HG A1C LEVEL LT 7.0%: CPT | Mod: CPTII,S$GLB,, | Performed by: INTERNAL MEDICINE

## 2024-05-07 PROCEDURE — 82607 VITAMIN B-12: CPT | Performed by: INTERNAL MEDICINE

## 2024-05-07 PROCEDURE — 3008F BODY MASS INDEX DOCD: CPT | Mod: CPTII,S$GLB,, | Performed by: INTERNAL MEDICINE

## 2024-05-07 PROCEDURE — 36415 COLL VENOUS BLD VENIPUNCTURE: CPT | Performed by: INTERNAL MEDICINE

## 2024-05-07 PROCEDURE — 1160F RVW MEDS BY RX/DR IN RCRD: CPT | Mod: CPTII,S$GLB,, | Performed by: INTERNAL MEDICINE

## 2024-05-07 PROCEDURE — 82746 ASSAY OF FOLIC ACID SERUM: CPT | Performed by: INTERNAL MEDICINE

## 2024-05-07 PROCEDURE — 1159F MED LIST DOCD IN RCRD: CPT | Mod: CPTII,S$GLB,, | Performed by: INTERNAL MEDICINE

## 2024-05-07 PROCEDURE — 99204 OFFICE O/P NEW MOD 45 MIN: CPT | Mod: S$GLB,,, | Performed by: INTERNAL MEDICINE

## 2024-05-07 PROCEDURE — 3074F SYST BP LT 130 MM HG: CPT | Mod: CPTII,S$GLB,, | Performed by: INTERNAL MEDICINE

## 2024-05-07 RX ORDER — CYCLOSPORINE 0.5 MG/ML
1 EMULSION OPHTHALMIC 2 TIMES DAILY
COMMUNITY

## 2024-05-07 RX ORDER — AMOXICILLIN 500 MG
CAPSULE ORAL DAILY
COMMUNITY

## 2024-05-07 RX ORDER — CHOLECALCIFEROL (VITAMIN D3) 25 MCG
TABLET,CHEWABLE ORAL
COMMUNITY

## 2024-05-07 RX ORDER — ALBUTEROL SULFATE 90 UG/1
AEROSOL, METERED RESPIRATORY (INHALATION)
COMMUNITY

## 2024-05-07 RX ORDER — PNV NO.95/FERROUS FUM/FOLIC AC 28MG-0.8MG
TABLET ORAL ONCE
COMMUNITY

## 2024-05-07 RX ORDER — METRONIDAZOLE 7.5 MG/G
CREAM TOPICAL
COMMUNITY
Start: 2024-04-15

## 2024-05-07 RX ORDER — ACETAMINOPHEN 500 MG
TABLET ORAL
COMMUNITY

## 2024-05-07 RX ORDER — IBUPROFEN 100 MG/5ML
1000 SUSPENSION, ORAL (FINAL DOSE FORM) ORAL DAILY
COMMUNITY

## 2024-05-08 LAB — HEMATOLOGIST REVIEW: NORMAL

## 2024-05-09 ENCOUNTER — PATIENT MESSAGE (OUTPATIENT)
Dept: HEMATOLOGY/ONCOLOGY | Facility: CLINIC | Age: 54
End: 2024-05-09
Payer: COMMERCIAL

## 2024-05-09 LAB — METHYLMALONATE SERPL-SCNC: 0.09 NMOL/ML

## 2024-07-08 DIAGNOSIS — J30.2 SEASONAL ALLERGIES: ICD-10-CM

## 2024-07-08 RX ORDER — MONTELUKAST SODIUM 10 MG/1
TABLET ORAL
Qty: 30 TABLET | Refills: 11 | Status: SHIPPED | OUTPATIENT
Start: 2024-07-08

## 2024-09-27 DIAGNOSIS — Z13.6 ENCOUNTER FOR LIPID SCREENING FOR CARDIOVASCULAR DISEASE: ICD-10-CM

## 2024-09-27 DIAGNOSIS — R73.01 ELEVATED FASTING GLUCOSE: Primary | ICD-10-CM

## 2024-09-27 DIAGNOSIS — E03.9 HYPOTHYROIDISM, UNSPECIFIED TYPE: ICD-10-CM

## 2024-09-27 DIAGNOSIS — D70.8 OTHER NEUTROPENIA: ICD-10-CM

## 2024-09-27 DIAGNOSIS — R53.82 CHRONIC FATIGUE: ICD-10-CM

## 2024-09-27 DIAGNOSIS — Z13.220 ENCOUNTER FOR LIPID SCREENING FOR CARDIOVASCULAR DISEASE: ICD-10-CM

## 2024-10-07 ENCOUNTER — LAB VISIT (OUTPATIENT)
Dept: LAB | Facility: HOSPITAL | Age: 54
End: 2024-10-07
Attending: INTERNAL MEDICINE
Payer: COMMERCIAL

## 2024-10-07 DIAGNOSIS — Z13.6 ENCOUNTER FOR LIPID SCREENING FOR CARDIOVASCULAR DISEASE: ICD-10-CM

## 2024-10-07 DIAGNOSIS — D70.8 OTHER NEUTROPENIA: ICD-10-CM

## 2024-10-07 DIAGNOSIS — R73.01 ELEVATED FASTING GLUCOSE: ICD-10-CM

## 2024-10-07 DIAGNOSIS — R53.82 CHRONIC FATIGUE: ICD-10-CM

## 2024-10-07 DIAGNOSIS — Z13.220 ENCOUNTER FOR LIPID SCREENING FOR CARDIOVASCULAR DISEASE: ICD-10-CM

## 2024-10-07 DIAGNOSIS — E03.9 HYPOTHYROIDISM, UNSPECIFIED TYPE: ICD-10-CM

## 2024-10-07 LAB
ALBUMIN SERPL-MCNC: 4.2 G/DL (ref 3.5–5)
ALBUMIN/GLOB SERPL: 1.6 RATIO (ref 1.1–2)
ALP SERPL-CCNC: 56 UNIT/L (ref 40–150)
ALT SERPL-CCNC: 32 UNIT/L (ref 0–55)
ANION GAP SERPL CALC-SCNC: 8 MEQ/L
AST SERPL-CCNC: 33 UNIT/L (ref 5–34)
BASOPHILS # BLD AUTO: 0.02 X10(3)/MCL
BASOPHILS NFR BLD AUTO: 0.5 %
BILIRUB SERPL-MCNC: 0.6 MG/DL
BUN SERPL-MCNC: 14.2 MG/DL (ref 9.8–20.1)
CALCIUM SERPL-MCNC: 9.9 MG/DL (ref 8.4–10.2)
CHLORIDE SERPL-SCNC: 110 MMOL/L (ref 98–107)
CHOLEST SERPL-MCNC: 176 MG/DL
CHOLEST/HDLC SERPL: 4 {RATIO} (ref 0–5)
CO2 SERPL-SCNC: 28 MMOL/L (ref 22–29)
CREAT SERPL-MCNC: 0.94 MG/DL (ref 0.55–1.02)
CREAT/UREA NIT SERPL: 15
EOSINOPHIL # BLD AUTO: 0.27 X10(3)/MCL (ref 0–0.9)
EOSINOPHIL NFR BLD AUTO: 6.5 %
ERYTHROCYTE [DISTWIDTH] IN BLOOD BY AUTOMATED COUNT: 12.4 % (ref 11.5–17)
GFR SERPLBLD CREATININE-BSD FMLA CKD-EPI: >60 ML/MIN/1.73/M2
GLOBULIN SER-MCNC: 2.6 GM/DL (ref 2.4–3.5)
GLUCOSE SERPL-MCNC: 113 MG/DL (ref 74–100)
HCT VFR BLD AUTO: 43.9 % (ref 37–47)
HDLC SERPL-MCNC: 48 MG/DL (ref 35–60)
HGB BLD-MCNC: 14.7 G/DL (ref 12–16)
IMM GRANULOCYTES # BLD AUTO: 0.01 X10(3)/MCL (ref 0–0.04)
IMM GRANULOCYTES NFR BLD AUTO: 0.2 %
LDLC SERPL CALC-MCNC: 105 MG/DL (ref 50–140)
LYMPHOCYTES # BLD AUTO: 1.66 X10(3)/MCL (ref 0.6–4.6)
LYMPHOCYTES NFR BLD AUTO: 40.2 %
MCH RBC QN AUTO: 30.4 PG (ref 27–31)
MCHC RBC AUTO-ENTMCNC: 33.5 G/DL (ref 33–36)
MCV RBC AUTO: 90.7 FL (ref 80–94)
MONOCYTES # BLD AUTO: 0.28 X10(3)/MCL (ref 0.1–1.3)
MONOCYTES NFR BLD AUTO: 6.8 %
NEUTROPHILS # BLD AUTO: 1.89 X10(3)/MCL (ref 2.1–9.2)
NEUTROPHILS NFR BLD AUTO: 45.8 %
NRBC BLD AUTO-RTO: 0 %
PLATELET # BLD AUTO: 198 X10(3)/MCL (ref 130–400)
PMV BLD AUTO: 9 FL (ref 7.4–10.4)
POTASSIUM SERPL-SCNC: 4.6 MMOL/L (ref 3.5–5.1)
PROT SERPL-MCNC: 6.8 GM/DL (ref 6.4–8.3)
RBC # BLD AUTO: 4.84 X10(6)/MCL (ref 4.2–5.4)
SODIUM SERPL-SCNC: 146 MMOL/L (ref 136–145)
TRIGL SERPL-MCNC: 117 MG/DL (ref 37–140)
VLDLC SERPL CALC-MCNC: 23 MG/DL
WBC # BLD AUTO: 4.13 X10(3)/MCL (ref 4.5–11.5)

## 2024-10-07 PROCEDURE — 80061 LIPID PANEL: CPT

## 2024-10-07 PROCEDURE — 82172 ASSAY OF APOLIPOPROTEIN: CPT

## 2024-10-07 PROCEDURE — 36415 COLL VENOUS BLD VENIPUNCTURE: CPT

## 2024-10-07 PROCEDURE — 80053 COMPREHEN METABOLIC PANEL: CPT

## 2024-10-07 PROCEDURE — 85025 COMPLETE CBC W/AUTO DIFF WBC: CPT

## 2024-10-08 ENCOUNTER — OFFICE VISIT (OUTPATIENT)
Dept: FAMILY MEDICINE | Facility: CLINIC | Age: 54
End: 2024-10-08
Payer: COMMERCIAL

## 2024-10-08 VITALS
RESPIRATION RATE: 14 BRPM | BODY MASS INDEX: 30.23 KG/M2 | DIASTOLIC BLOOD PRESSURE: 80 MMHG | OXYGEN SATURATION: 98 % | TEMPERATURE: 99 F | SYSTOLIC BLOOD PRESSURE: 120 MMHG | WEIGHT: 160.13 LBS | HEIGHT: 61 IN | HEART RATE: 82 BPM

## 2024-10-08 DIAGNOSIS — F41.9 ANXIETY: ICD-10-CM

## 2024-10-08 DIAGNOSIS — I83.813 VARICOSE VEINS OF BOTH LOWER EXTREMITIES WITH PAIN: ICD-10-CM

## 2024-10-08 DIAGNOSIS — R53.82 CHRONIC FATIGUE: ICD-10-CM

## 2024-10-08 DIAGNOSIS — E03.9 HYPOTHYROIDISM, UNSPECIFIED TYPE: Primary | ICD-10-CM

## 2024-10-08 LAB — APO B SERPL-MCNC: 84 MG/DL

## 2024-10-08 PROCEDURE — 1160F RVW MEDS BY RX/DR IN RCRD: CPT | Mod: CPTII,,, | Performed by: INTERNAL MEDICINE

## 2024-10-08 PROCEDURE — 99214 OFFICE O/P EST MOD 30 MIN: CPT | Mod: ,,, | Performed by: INTERNAL MEDICINE

## 2024-10-08 PROCEDURE — 3079F DIAST BP 80-89 MM HG: CPT | Mod: CPTII,,, | Performed by: INTERNAL MEDICINE

## 2024-10-08 PROCEDURE — 1159F MED LIST DOCD IN RCRD: CPT | Mod: CPTII,,, | Performed by: INTERNAL MEDICINE

## 2024-10-08 PROCEDURE — 3044F HG A1C LEVEL LT 7.0%: CPT | Mod: CPTII,,, | Performed by: INTERNAL MEDICINE

## 2024-10-08 PROCEDURE — 3074F SYST BP LT 130 MM HG: CPT | Mod: CPTII,,, | Performed by: INTERNAL MEDICINE

## 2024-10-08 PROCEDURE — 3008F BODY MASS INDEX DOCD: CPT | Mod: CPTII,,, | Performed by: INTERNAL MEDICINE

## 2024-10-08 RX ORDER — MAGNESIUM 200 MG
1 TABLET ORAL ONCE
COMMUNITY

## 2024-10-08 NOTE — PROGRESS NOTES
Subjective:      Patient ID: Glenda Franklin is a 54 y.o. female.    Chief Complaint: Follow-up    HPI  6 month f/u    Doing well today, no acute issues  Labs improved  Youngest son senior in HS, other son in college studying engineering  Lifestyle and diet changes- more keto at home, doing better down 5 pounds and feeling better      S/p EGD for esophagitis, s/p dilation  Colonoscopy done with polyp removed        Chronic Medical Conditions:  Hashimoto's thyroiditis, thyroid nodule - Dr. Mendosa following  Compliant with medication    chronic fatigue: unsure of etiology; sounds like GEMINI has a big part of this along with stress; her son has crohn's disease and she finds herself so focused on his health or with her work that she does not take time for herself     Rosacea:  -seen by dermatology, Dr. Lerma  Provided Rx minocycline      Family history of breast cancer: genetic counseling completed, she is BRCA negative      GYN: Dr. Ann  last pap smear: 2022, negative for malignancy  Mammogram: [facility] 23 negative for malignancy  Mammogram scheduled 2024   F/u on status patient says this was done, will request copy of report   Colon Cancer Screenin2023 , repeat in 5 years ; Dr. 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   HENT:  Negative for congestion, sinus pressure and sore throat.    Respiratory:  Negative for cough and shortness of breath.    Cardiovascular:  Negative for chest pain and palpitations.  "  Gastrointestinal:  Negative for abdominal pain and nausea.   Skin:  Negative for rash.       Objective:     Vitals:    10/08/24 1048   BP: 120/80   BP Location: Left arm   Patient Position: Sitting   Pulse: 82   Resp: 14   Temp: 98.5 °F (36.9 °C)   TempSrc: Temporal   SpO2: 98%   Weight: 72.6 kg (160 lb 1.6 oz)   Height: 5' 1" (1.549 m)     Physical Exam  Vitals and nursing note reviewed.   Constitutional:       General: She is not in acute distress.     Appearance: Normal appearance. She is not ill-appearing.   HENT:      Head: Normocephalic and atraumatic.   Cardiovascular:      Rate and Rhythm: Normal rate and regular rhythm.   Pulmonary:      Effort: Pulmonary effort is normal.      Breath sounds: Normal breath sounds.   Abdominal:      General: Abdomen is flat. There is no distension.      Palpations: Abdomen is soft.      Tenderness: There is no abdominal tenderness.   Musculoskeletal:      Right lower leg: No edema.      Left lower leg: No edema.   Neurological:      Mental Status: She is alert.       Assessment/Plan:     1. Hypothyroidism, unspecified type  Stable ccm  2. Chronic fatigue  Stable unchanged  Continue to work on healthy dietary changes  Reducing processed sugar,foods, avoid dairy, reducing gluten  3. Anxiety  Stable sx controlled  4. Varicose veins of both lower extremities with pain  -     Ambulatory referral/consult to Vascular Surgery; Future; Expected date: 10/15/2024  Painful veins  Referral to eval and treat     Follow up in about 6 months (around 4/8/2025) for Annual Wellness.    This includes face to face time and non-face to face time preparing to see the patient (eg, review of tests), obtaining and/or reviewing separately obtained history, documenting clinical information in the electronic or other health record, independently interpreting results and communicating results to the patient/family/caregiver, or care coordinator.                      "

## 2024-10-09 ENCOUNTER — PATIENT OUTREACH (OUTPATIENT)
Facility: CLINIC | Age: 54
End: 2024-10-09
Payer: COMMERCIAL

## 2024-10-09 NOTE — PROGRESS NOTES
Health Maintenance Topic(s) Outreach Outcomes & Actions Taken:    Breast Cancer Screening - Outreach Outcomes & Actions Taken  : External Records Requested & Care Team Updated if Applicable       Additional Notes:  Upload Mammogram Report: [facility]

## 2024-10-21 RX ORDER — METFORMIN HYDROCHLORIDE 500 MG/1
500 TABLET ORAL 2 TIMES DAILY WITH MEALS
Qty: 60 TABLET | Refills: 11 | Status: SHIPPED | OUTPATIENT
Start: 2024-10-21 | End: 2025-10-21

## 2024-10-21 NOTE — TELEPHONE ENCOUNTER
Please see the attached refill request.  Pt requesting to increase to 1 tab po BID  Please advise  Thanks

## 2024-11-22 RX ORDER — BACLOFEN 20 MG
TABLET ORAL
COMMUNITY

## 2024-11-22 RX ORDER — PANTOPRAZOLE SODIUM 40 MG/1
TABLET, DELAYED RELEASE ORAL
COMMUNITY
End: 2024-12-05

## 2024-11-22 RX ORDER — LORAZEPAM 1 MG/1
1 TABLET ORAL
COMMUNITY
Start: 2024-08-06 | End: 2024-12-05

## 2024-11-26 NOTE — PROGRESS NOTES
[facility] Vascular - Clinic Note  Roseann Ramos MD      Patient Name: Glenda Franklin                   : 1970      MRN: [id number]   Visit Date: 12/3/2024       History Present Illness     Reason for Visit: Varicose Veins    Ms. Franklin presents to the clinic for evaluation of varicose veins to her legs. She was previously seen by Dr. Guidry and is requesting a second opinion. Venous ultrasound was obtained with their office in July (saved in media).     Pain and discomfort in her legs. She states her symptoms start at her feet. She describes her symptoms as pins and needles that travel up her leg. She uses compression daily - 15-20 mmHg - and notes this does improve her symptoms significantly. She has done some sclerotherapy in the past. She doesn't feel like this has made improvement to her vessels. She has left leg symptoms are worse than her right. 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 She has been treated for neuropathy in the past and does note this did help at that time.     She does endorse SI joint, lower back, and sciatic issues. Denies history of deep vein thrombosis. Recalls history of bleeding varicose veins with her grandmother.         REVIEW OF SYSTEMS:  Review of systems conducted, negative except as stated in the history of present illness. See HPI for details.        Physical Exam      Vitals:    24 1333 24 1334   BP: 132/83 115/80   BP Location: Right arm Left arm   Patient Position: Sitting Sitting   Pulse: 86 86          General: well-nourished, no acute distress, and healthy appearing, ambulating normally, alert, pleasant, conversant, and oriented  Neurologic: cranial nerves are grossly intact, no neurologic deficits, no motor deficits, and no sensory deficits  HEENT: grossly normal and no scleral icterus  Neck/Chest: normal  and soft without lymphadenopathy  Respiratory: breathing easily and without respiratory distress  Abdomen: normal  and soft  Cardiology: regular rate and rhythm    Upper Extremity Arterial Exam:   Right - radial is palpable  Left - radial is palpable    Lower Extremity Arterial Exam:  Right - posterior tibial is non-palpable and dorsalis pedis is palpable  Left - posterior tibial is palpable and dorsalis pedis is palpable    Musculoskeletal:   Lower Extremity: no edema present to bilateral lower extremities    Skin: has no obvious skin abnormality to lower extremities    Venous Exam:  Right Lower Extremity - spider veins posterior thigh   Left Lower Extremity - spider veins posterior thigh  No appreciable varicose veins;  no dermatosclerosis or hemosiderin deposition;                       Assessment and Plan     Ms. Franklin is a 54 y.o. woman with leg discomfort and history of low back pain.   She has some spider veins but she is without appreciable varicosities or typical stigmata of venous insufficiency.     Venous ultrasound obtained in July of her lower extremity veins identifies focal reflux at the bilateral saphenofemoral junctions (left slightly worse than right) without reflux or dilation through the remainder of the saphenous vein.      She has minimal signs of venous disease and I do not suspect she would get symptomatic benefit from venous intervention. OK to repeat her venous ultrasound if she chooses to acquire a more updated study. Encourage her continued use of compression stockings. She will call us if she would like to proceed with further testing.           1. Spider veins of both lower extremities  - Ambulatory referral/consult to Vascular Surgery    2. Low back pain due to bilateral sciatica          Imaging Obtained/Reviewed   Study: bilateral lower extremity venous duplex  Date:   7/23/24  Outside images from [facility]        Medical History     Past Medical History:   Diagnosis Date    Anxiety     Asthma     Hashimoto's thyroiditis     Hypothyroidism     Low back pain     Personal history of colonic polyps  2023    Dereck Parks MD     Past Surgical History:   Procedure Laterality Date    BUNIONECTOMY       SECTION      x2 2004 & 2006     Family History   Problem Relation Name Age of Onset    Cancer Mother       Social History     Socioeconomic History    Marital status:    Tobacco Use    Smoking status: Never    Smokeless tobacco: Never   Substance and Sexual Activity    Alcohol use: Not Currently    Drug use: Never     Social Drivers of Health     Financial Resource Strain: Low Risk  (10/6/2024)    Overall Financial Resource Strain (CARDIA)     Difficulty of Paying Living Expenses: Not very hard   Food Insecurity: No Food Insecurity (10/6/2024)    Hunger Vital Sign     Worried About Running Out of Food in the Last Year: Never true     Ran Out of Food in the Last Year: Never true   Physical Activity: Sufficiently Active (10/6/2024)    Exercise Vital Sign     Days of Exercise per Week: 6 days     Minutes of Exercise per Session: 30 min   Stress: No Stress Concern Present (10/6/2024)    [place] [employer] [place] [employer] - Occupational Stress Questionnaire     Feeling of Stress : Only a little   Housing Stability: Unknown (10/6/2024)    Housing Stability Vital Sign     Unable to Pay for Housing in the Last Year: No     Current Outpatient Medications   Medication Instructions    albuterol (VENTOLIN HFA) 90 mcg/actuation inhaler 1 puff by mouth once a day as needed    ascorbic acid (vitamin C) (VITAMIN C) 1,000 mg, Daily    calcium carbonate-vitamin D3 500 mg-10 mcg (400 unit) Tab Once    cholecalciferol, vitamin D3, (VITAMIN D3) 50 mcg (2,000 unit) Cap capsule 0    COLLAGEN MISC by Misc.(Non-Drug; Combo Route) route.    cyanocobalamin, vitamin B-12, 2,500 mcg Tab 0    cycloSPORINE (RESTASIS) 0.05 % ophthalmic emulsion 1 drop, 2 times daily    diclofenac sodium (VOLTAREN) 2 g, Topical (Top), 4 times daily    diindolylmethane-broccoli seed 150-30 mg Cap     estradioL (ESTRACE) 1 g, Twice weekly     folic acid (FOLVITE) 1,000 mcg, Daily     mg, As needed (PRN)    Lactobac no.41/Bifidobact no.7 (PROBIOTIC-10 ORAL) Take by mouth.    levothyroxine (SYNTHROID) 50 mcg, Daily    liothyronine (CYTOMEL) 15 mcg, Daily    LORazepam (ATIVAN) 1 mg    magnesium 200 mg Tab 1 tablet, Once    magnesium oxide 500 mg magnesium Tab as directed Orally    metFORMIN (GLUCOPHAGE) 500 mg, Oral, 2 times daily with meals    metronidazole 0.75% (METROCREAM) 0.75 % Crea APPLY TO FACE FOR ROSACEA ONCE OR TWICE DAILY    montelukast (SINGULAIR) 10 mg tablet TAKE ONE TABLET BY MOUTH DAILY    multivit-minerals/folic acid (CENTRUM ADULTS ORAL) Take by mouth.    omega-3 fatty acids/fish oil (FISH OIL-OMEGA-3 FATTY ACIDS) 300-1,000 mg capsule Daily    pantoprazole (PROTONIX) 40 MG tablet 1 tablet Orally Once a day for 30 day(s)    PREVIDENT 5000 PLUS 1.1 % Crea Nightly    PROGESTERONE, BULK, MISC by Misc.(Non-Drug; Combo Route) route.    psyllium (HYDROCIL) packet 1 packet, Daily    TURMERIC ORAL Take by mouth.     Review of patient's allergies indicates:   Allergen Reactions    Codeine      Other reaction(s): Unknown    Other reaction(s): Unknown (qualifier value)    Other Reaction(s): intolerant, Unknown    Iodine      Other Reaction(s): Unknown       Patient Care Team:  Kailee Almodovar MD as PCP - General (Internal Medicine)  Dereck Parks MD as Consulting Physician (Gastroenterology)  Fransisco Siddiqi DPM as Consulting Physician (Podiatry)  Jose Guidry MD as Consulting Physician (Cardiology)        No follow-ups on file. In addition to their scheduled follow up, the patient has also been instructed to follow up on as needed basis.     Future Appointments   Date Time Provider Department Center   4/15/2025 10:30 AM Kailee Almodovar MD [facility]

## 2024-12-03 ENCOUNTER — OFFICE VISIT (OUTPATIENT)
Dept: VASCULAR SURGERY | Facility: CLINIC | Age: 54
End: 2024-12-03
Payer: COMMERCIAL

## 2024-12-03 VITALS — HEART RATE: 86 BPM | DIASTOLIC BLOOD PRESSURE: 80 MMHG | SYSTOLIC BLOOD PRESSURE: 115 MMHG

## 2024-12-03 DIAGNOSIS — M54.42 LOW BACK PAIN DUE TO BILATERAL SCIATICA: Primary | ICD-10-CM

## 2024-12-03 DIAGNOSIS — M54.41 LOW BACK PAIN DUE TO BILATERAL SCIATICA: Primary | ICD-10-CM

## 2024-12-03 DIAGNOSIS — I83.93 SPIDER VEINS OF BOTH LOWER EXTREMITIES: ICD-10-CM

## 2025-03-11 ENCOUNTER — TELEPHONE (OUTPATIENT)
Dept: FAMILY MEDICINE | Facility: CLINIC | Age: 55
End: 2025-03-11
Payer: COMMERCIAL

## 2025-03-11 DIAGNOSIS — Z13.220 ENCOUNTER FOR LIPID SCREENING FOR CARDIOVASCULAR DISEASE: ICD-10-CM

## 2025-03-11 DIAGNOSIS — Z13.6 ENCOUNTER FOR LIPID SCREENING FOR CARDIOVASCULAR DISEASE: ICD-10-CM

## 2025-03-11 DIAGNOSIS — R73.01 ELEVATED FASTING GLUCOSE: ICD-10-CM

## 2025-03-11 DIAGNOSIS — D70.8 OTHER NEUTROPENIA: ICD-10-CM

## 2025-03-11 DIAGNOSIS — K21.9 GASTROESOPHAGEAL REFLUX DISEASE WITHOUT ESOPHAGITIS: ICD-10-CM

## 2025-03-11 DIAGNOSIS — Z00.00 WELLNESS EXAMINATION: Primary | ICD-10-CM

## 2025-03-11 DIAGNOSIS — E03.9 HYPOTHYROIDISM, UNSPECIFIED TYPE: ICD-10-CM

## 2025-03-11 NOTE — TELEPHONE ENCOUNTER
----- Message from Derrick sent at 3/11/2025  9:31 AM CDT -----  Who Called: Glenda Barber is requesting assistance/information from provider's office.Symptoms (please be specific):  How long has patient had these symptoms:  List of preferred pharmacies on file (remove unneeded): [unfilled]If different, enter pharmacy into here including location and phone number: Patient's Preferred Phone Number on File: [phone] Best Call Back Number, if different:Additional Information: please order pt labs before wellness visit

## 2025-05-20 LAB
BCS RECOMMENDATION EXT: NORMAL
BCS RECOMMENDATION EXT: NORMAL

## 2025-05-21 ENCOUNTER — DOCUMENTATION ONLY (OUTPATIENT)
Dept: FAMILY MEDICINE | Facility: CLINIC | Age: 55
End: 2025-05-21
Payer: COMMERCIAL

## 2025-05-21 ENCOUNTER — RESULTS FOLLOW-UP (OUTPATIENT)
Dept: FAMILY MEDICINE | Facility: CLINIC | Age: 55
End: 2025-05-21

## 2025-05-21 ENCOUNTER — TELEPHONE (OUTPATIENT)
Dept: FAMILY MEDICINE | Facility: CLINIC | Age: 55
End: 2025-05-21
Payer: COMMERCIAL

## 2025-05-21 NOTE — TELEPHONE ENCOUNTER
Please reach out to patient to schedule telemed visit with Dr. Almodovar to follow up on mammogram results. Thank You!

## 2025-05-23 ENCOUNTER — OFFICE VISIT (OUTPATIENT)
Dept: FAMILY MEDICINE | Facility: CLINIC | Age: 55
End: 2025-05-23
Payer: COMMERCIAL

## 2025-05-23 ENCOUNTER — TELEPHONE (OUTPATIENT)
Dept: FAMILY MEDICINE | Facility: CLINIC | Age: 55
End: 2025-05-23

## 2025-05-23 ENCOUNTER — PATIENT MESSAGE (OUTPATIENT)
Dept: FAMILY MEDICINE | Facility: CLINIC | Age: 55
End: 2025-05-23

## 2025-05-23 DIAGNOSIS — Z80.3 FAMILY HISTORY OF BREAST CANCER: Primary | ICD-10-CM

## 2025-05-23 NOTE — TELEPHONE ENCOUNTER
Copied from CRM #[id number]. Topic: General Inquiry - Patient Advice  >> May 23, 2025  8:38 AM Jojo wrote:  Who Called: Glenda Franklin    Caller is requesting assistance/information from provider's office.    Symptoms (please be specific): NA   How long has patient had these symptoms:  NA  List of preferred pharmacies on file (remove unneeded): [unfilled]  If different, enter pharmacy into here including location and phone number: NA      Preferred Method of Contact: Phone Call  Patient's Preferred Phone Number on File: [phone]   Best Call Back Number, if different:  Additional Information: medical advice, pt called to discuss a cancer question asked during V/V appt today 5/23/25 @ 8AM,  that pt would like to discuss ASAP, please advise, thanks

## 2025-05-23 NOTE — PROGRESS NOTES
TELEMEDICINE VISIT     Patient ID: Glenda Franklin is a 54 y.o. female.  MRN: [id number]  : 1970    Subjective:        TELEMEDICINE  The patient location is: home  The chief complaint leading to consultation is: high risk breast cancer     Visit type: Virtual visit with synchronous audio and video    Total time spent with patient: 20 minutes  25 minutes of total time spent on the encounter, which includes face to face time and non-face to face time preparing to see the patient (eg, review of tests), obtaining and/or reviewing separately obtained history, documenting clinical information in the electronic or other health record, independently interpreting results (not separately reported) and communicating results to the patient/family/caregiver, or care coordination (not separately reported).    Each patient to whom he or she provides medical services by telemedicine is:  (1) informed of the relationship between the physician and patient and the respective role of any other health care provider with respect to management of the patient; and (2) notified that he or she may decline to receive medical services by telemedicine and may withdraw from such care at any time.    HPI: HPI     Health maintenance reviewed with the patient.  Health maintenance completed:  Health Maintenance Topics with due status: Not Due       Topic Last Completion Date    Influenza Vaccine 2021    Colorectal Cancer Screening 2023    Hemoglobin A1c (Diabetic Prevention Screening) 2024    Lipid Panel 10/07/2024    Cervical Cancer Screening 2024    Mammogram 2025    RSV Vaccine (Age 60+ and Pregnant patients) Not Due      Health maintenance due:  Health Maintenance Due   Topic Date Due    COVID-19 Vaccine (1) Never done    TETANUS VACCINE  Never done    Shingles Vaccine (1 of 2) Never done    Pneumococcal Vaccines (Age 50+) (1 of 2 - PCV) Never done      ROS:  Review of Systems   Constitutional:  Negative for  activity change and unexpected weight change.   HENT:  Negative for hearing loss, rhinorrhea and trouble swallowing.    Eyes:  Negative for discharge and visual disturbance.   Respiratory:  Negative for chest tightness and wheezing.    Cardiovascular:  Negative for chest pain and palpitations.   Gastrointestinal:  Negative for blood in stool, constipation, diarrhea and vomiting.   Endocrine: Negative for polydipsia and polyuria.   Genitourinary:  Negative for difficulty urinating, dysuria, hematuria and menstrual problem.   Musculoskeletal:  Negative for arthralgias, joint swelling and neck pain.   Neurological:  Negative for weakness and headaches.   Psychiatric/Behavioral:  Negative for confusion and dysphoric mood.       History:     Past Medical History:   Diagnosis Date    Anxiety     Asthma     Hashimoto's thyroiditis     Hypothyroidism     Low back pain     Personal history of colonic polyps 2023    Dereck Parks MD      Past Surgical History:   Procedure Laterality Date    BUNIONECTOMY       SECTION      x2  &      Family History   Problem Relation Name Age of Onset    Cancer Mother        Social History     Tobacco Use    Smoking status: Never    Smokeless tobacco: Never   Substance and Sexual Activity    Alcohol use: Not Currently    Drug use: Never    Sexual activity: Not on file          Allergies:   Review of patient's allergies indicates:   Allergen Reactions    Codeine      Other reaction(s): Unknown    Other reaction(s): Unknown (qualifier value)    Other Reaction(s): intolerant, Unknown    Iodine      Other Reaction(s): Unknown     Objective:   There were no vitals filed for this visit.      Physical Examination: Physical exam was not performed during this virtual visit.  Physical Exam    Labs:   Diagnostic Tests:  Significant Imaging: I have reviewed and interpreted all pertinent imaging results/findings.  No image results found.      Laboratory Data:  All pertinent labs have  been reviewed.  I have reviewed the following records today:     Details:   [x] Labs [] Internal  [] External    [] Micro [] Internal  [] External    [] Pathology [] Internal  [] External    [x] Imaging [] Internal  [] External    [x] Cardiology Procedures [] Internal  [] External    [x] Provider Records [] Internal  [] External    [] Other [] Internal  [] External      Labs:   Lab Results   Component Value Date    WBC 4.13 (L) 10/07/2024    RBC 4.84 10/07/2024    HGB 14.7 10/07/2024    HCT 43.9 10/07/2024    MCV 90.7 10/07/2024    MCH 30.4 10/07/2024     10/07/2024     (H) 10/07/2024     (H) 10/07/2024    K 4.6 10/07/2024     (H) 10/07/2024    BUN 14.2 10/07/2024    CREATININE 0.94 10/07/2024    AST 33 10/07/2024    ALT 32 10/07/2024    BILITOT 0.6 10/07/2024    TSH 0.284 (L) 05/09/2023    HGBA1C 5.1 04/16/2024      Medications:     Medication List with Changes/Refills   Current Medications    ALBUTEROL (VENTOLIN HFA) 90 MCG/ACTUATION INHALER    1 puff by mouth once a day as needed    ASCORBIC ACID, VITAMIN C, (VITAMIN C) 1000 MG TABLET    Take 1,000 mg by mouth once daily.    CALCIUM CARBONATE-VITAMIN D3 500 MG-10 MCG (400 UNIT) TAB    Take by mouth once.    CHOLECALCIFEROL, VITAMIN D3, (VITAMIN D3) 50 MCG (2,000 UNIT) CAP CAPSULE    0    COLLAGEN MISC    by Misc.(Non-Drug; Combo Route) route.    CYANOCOBALAMIN, VITAMIN B-12, 2,500 MCG TAB    0    CYCLOSPORINE (RESTASIS) 0.05 % OPHTHALMIC EMULSION    1 drop 2 (two) times daily.    DICLOFENAC SODIUM (VOLTAREN) 1 % GEL    Apply 2 g topically 4 (four) times daily.    DIINDOLYLMETHANE-BROCCOLI SEED 150-30 MG CAP        ESTRADIOL (ESTRACE) 0.01 % (0.1 MG/GRAM) VAGINAL CREAM    Place 1 g vaginally twice a week.    FOLIC ACID (FOLVITE) 1 MG TABLET    Take 1,000 mcg by mouth once daily.     MG TABLET    Take 800 mg by mouth as needed.    LACTOBAC NO.41/BIFIDOBACT NO.7 (PROBIOTIC-10 ORAL)    Take by mouth.    LEVOTHYROXINE (SYNTHROID) 50  MCG TABLET    Take 50 mcg by mouth once daily.    LIOTHYRONINE (CYTOMEL) 5 MCG TAB    Take 15 mcg by mouth once daily. 1 tab by mouth Tues, Thurs, Sat, Sun & 2 tabs by mouth Mon, Wed, Fri    MAGNESIUM 200 MG TAB    Take 1 tablet by mouth once.    MAGNESIUM OXIDE 500 MG MAGNESIUM TAB    as directed Orally    METFORMIN (GLUCOPHAGE) 500 MG TABLET    Take 1 tablet (500 mg total) by mouth 2 (two) times daily with meals.    METRONIDAZOLE 0.75% (METROCREAM) 0.75 % CREA    APPLY TO FACE FOR ROSACEA ONCE OR TWICE DAILY    MONTELUKAST (SINGULAIR) 10 MG TABLET    TAKE ONE TABLET BY MOUTH DAILY    MULTIVIT-MINERALS/FOLIC ACID (CENTRUM ADULTS ORAL)    Take by mouth.    OMEGA-3 FATTY ACIDS/FISH OIL (FISH OIL-OMEGA-3 FATTY ACIDS) 300-1,000 MG CAPSULE    Take by mouth once daily.    PREVIDENT 5000 PLUS 1.1 % CREA    Take by mouth every evening.    PROGESTERONE, BULK, MISC    by Misc.(Non-Drug; Combo Route) route.    PSYLLIUM (HYDROCIL) PACKET    Take 1 packet by mouth once daily.    TURMERIC ORAL    Take by mouth.     Assessment:     1. Family history of breast cancer      Plan:   Glenda was seen today for high risk breast cancer.    Diagnoses and all orders for this visit:    Family history of breast cancer  -     Ambulatory referral/consult to Breast Surgery; Future    Patient has a intramammary node on the R breast appears benign, was concerned about this given family history of breast cancer  Tyrer-Cuzick score was 16.8*  Advised patient to consider Myrisk genetic test by [facility] [employer] and refer to breast surgery to discuss if surveillance recs should change to adding breast MRI yearly or adding US Q6 mos interval      Follow Up:   No follow-ups on file.    I spent greater than 25 minutes today both in chart review and greater than 50% of that time in discussion with the patient regarding health maintenance, diagnoses, diagnostic tests, medications, treatments, symptom management, expected results and adverse effects. Patient verbalized  understanding and all questions were answered.

## 2025-06-11 NOTE — PROGRESS NOTES
"  Cancer Genetics  [facility] - Breast Surgery  [facility]          Date of Service:  2025  Provider:  Katerin Mcclain MS, [facility]  Collaborating physician: Aubree Dover MD    Patient Information  Name:  Glenda Franklin  :  1970  MRN:  [id number]        Referring Provider: Kailee Almodovar MD     Visit type: in-person.   Face-to-face time with patient: approximately 51 minutes.  Approximately 134 minutes in total were spent on this encounter, which includes face-to-face time and non-face-to-face time preparing to see the patient (e.g., review of tests), obtaining and/or reviewing separately obtained history, documenting clinical information in the electronic or other health record, independently interpreting results (not   reported) and communicating results to the patient/family/caregiver, or care coordination (not separately reported).      SUBJECTIVE:      Reason for Referral: Family history of breast cancer    History of Present Illness (HPI):  Glenda Franklin ("Glenda"), 54 y.o., assigned female sex at birth is new to the [facility] - Breast Surgery department.  She was referred by Family Medicine for genetic cancer risk assessment given her family history of breast cancer. She has no personal history of cancer. Given her sister's history of breast cancer and BRCA2 mutation, she underwent genetic testing with her OB/GYN, which was reportedly BRCA1 and BRCA2 negative. She is interested in additional genetic testing given her family history.    Focused Medical History:   Germline cancer-genetic testing:  Yes, BRCA1 and BRCA2 only - negative (ordered by Dr. Mayank Ann)  Cancer:  No  Colonoscopy: Yes  Most recent colonoscopy: 2023  Colon polyp:  Yes - 1 polyp  Mammogram: Yes  Most recent mammo: 2025  Breast MRI:  No  Other benign tumor/findings:  Yes  Endometriosis  PCOS  Pancreatitis:  No  Pancreatic cyst:  No  Reproductive " "organs intact    Breast-Specific History  Height:  5'1"  Weight:  160 lbs  Breast density per BI-RADS:  scattered fibroglandular densities  Age at menarche:  14 years  Age at first live birth:  34 years  Menopause:  postmenopausal at 53 years  HRT usage:  current user, progesterone cream only, used the estradiol cream for a few weeks  BRCA testing:  Yes, negative  Personal history of ovarian cancer:  No  Personal history of breast biopsy:  no  Ashkenazi Anglican inheritance:  No  History of XRT to chest wall:  No  Family history:  as detailed in pedigree/family history    Past Medical History:   Diagnosis Date    Anxiety     Asthma     Hashimoto's thyroiditis     Hypothyroidism     Low back pain     Personal history of colonic polyps 2023    Dereck Parks MD       Patient Active Problem List    Diagnosis Date Noted    GEMINI (obstructive sleep apnea) 10/17/2023    Gastroesophageal reflux disease 2023    Polyp of colon 2023    Thyroid nodule 2022    Hypothyroidism 2022    Chronic fatigue 2022    Family history of breast cancer 2022    Anxiety 2022        Past Surgical History:   Procedure Laterality Date    BUNIONECTOMY       SECTION      x2  &      Ancestry:  Ashkenazi Anglican ancestry:  No  Paternal: Shahla,   Maternal: Cajun Romanian, , English    Focused Family History:  Consanguinity in ancestors:  No  Germline cancer-genetic testing in blood relatives:  Yes  Sister - BRCA2 mutation, unable to retrieve a copy  Cancer in family:  Yes; there are no known blood relatives affected with cancer other than those mentioned in the pedigree below    Family Cancer Pedigree:           Review of Systems:  See HPI.      SUBJECTIVE:   Records Reviewed  Medical History  Problem List  Any pertinent, available Procedures and Pathology reports in both [facility] and [facility]    COUNSELING   Causes of cancer  Germline cancer " genetic testing is the testing of genes associated with cancer, known as cancer susceptibility genes.  Just as these genes are inherited from parents, mutations in these genes can be inherited, as well.  A mutation in a cancer susceptibility gene adversely affects the gene's ability to prevent cancer; therefore, carriers of cancer susceptibility gene mutations may be at increased risk for certain cancers.     Only 5-10% cancers are caused by a cancer susceptibility gene mutation, meaning the cancer is genetic/hereditary; rather, most cancers are sporadic.  Causes of sporadic cancers may include environmental risk factors, lifestyle risk factors, and non-modifiable risk factors.  It is important to note that members of a family often share not only their genetics but also risk factors including environmental and lifestyle risk factors, so cancers can be familial.    Mutations in BRCA1 and BRCA2 are associated with an increased risk for breast and ovarian cancer, in addition to male breast cancer, pancreatic, melanoma, and prostate cancer. Mutations in other genes may increase the risk of breast and prostate cancer, in addition to other cancers.      Potential results of genetic testing, and their implications  Potential results of genetic testing include positive, negative, and variant of unknown significance (VUS).    A positive result indicates the presence of at least one clinically significant mutation, and the patient's associated cancer risks vary depending upon the cancer susceptibility gene(s) in which there is/are a mutation(s).  With a positive result, in some cases, depending upon the specific result and the patient's clinical history, modified risk management may be recommended, including measures for risk reduction and/or surveillance; however, modified management is not always an option.    A negative result indicates that no clinically significant mutations were identified in the gene(s) tested.    A  VUS indicates that there is not presently enough data for the laboratory to make a determination as to whether the variant is clinically significant.  VUSs are not typically acted upon clinically.       The ability to interpret the meaning of a negative genetic testing result in genes associated with cancer with which the patient has not personally been affected, when done prior to testing the appropriate affected relative(s), is significantly limited.  A negative result in the patient does not indicate that she cannot develop cancer, and, in fact, the patient may even be at increased risk for cancer based on shared risk factors with affected relatives.  The most informative candidates for initial genetic testing in a family are those who have been affected with cancer.     Modified management may also be recommended, even with a result of no or unknown significance, based upon risk assessment that incorporates the family history.      Princeer Colbyzik Score  Breast Cancer Risk Stratification  Current estimated lifetime risk of breast cancer, as calculated utilizing the Tyrer-Cuzick risk-assessment model v8.0b:  20.4%.  This places the patient in the high-risk range according to current clinical guidelines.    As such, the NCCN guidelines recommend that she:  See a healthcare provider to review personal and family history, have her breast cancer risk assessed (her risk is not static and can change), discuss breast cancer risk reduction, and undergo a clinical breast exam.  This visit would typically be with a gynecologist or a breast healthcare specialist, should start at age 25, and should occur annually.  Practice breast awareness, which means that you are familiar with her breasts and perform periodic (I recommend at least monthly), consistent breast self-exams and promptly report to a healthcare provider (typically, a gynecologist or a breast healthcare specialist) any changes or concerns.  Breast awareness should  begin by age 25.  Undergo a screening mammogram (preferably, one that is 3-D) annually.  This can be ordered by her primary care provider, gynecologist, or breast healthcare specialist and should begin at age 40 or 10 prior to the youngest diagnosis in the family.  Consider meeting with a breast healthcare specialist to determine whether her would benefit from additional screenings for breast cancer, such as breast MRI.     Genetic mutation inheritance  If  Glenda tests positive for a mutation, her first-degree relatives would each have a 50% chance of having the same mutation, and other, more distantly related blood-relatives would also be at risk of having the same mutation.       Genetic discrimination  The Genetic Information Nondiscrimination Act (MANUEL) is U[place]. federal legislation that provides some protections against use of an individual's genetic information by their health insurer and by their employer.  Title I of MANUEL prohibits most health insurers from utilizing an individual's genetic information to make decisions regarding insurance eligibility or premium charges.  Title II of MANUEL prohibits covered entities, including employers, from requesting the genetic information of employees and applicants.  MANUEL does not protect individuals from genetic discrimination toward health insurance obtained through a job with the  or through the Federal Employees Health Benefits Plan; from genetic discrimination by employers with fewer than 15 employees or if employed by the ; or from genetic discrimination by any other type of policies/entities, including but not limited to life insurance, disability insurance, long-term care insurance,  benefits, and Citizen of [place] [employer] benefits.     Genetic testing logistics  An outside laboratory would perform the testing after a blood sample is collected or a saliva sample is collected by the patient at home.  With genetic testing, there is a potential  for the patient to incur out-of-pocket costs.  Results can take 2-3 weeks.  Post-test genetic counseling can be conducted once the genetic testing results are available.     Based on the information provided by  Glenda, she meets current criteria for genetic testing of hereditary breast cancer according to current clinical guidelines. Glenda's clinical history, including personal history and/or family history, is strongly suggestive of a hereditary cancer syndrome in the family given the family history of breast cancer, including a first-degree relative (with a history of a BRCA2 mutation), two second-degree relatives and three third-degree relatives. She also has a family history of male breast cancer in a second-degree relative.    Assessment    ICD-10   Family history of breast cancer Z80.3   2.   Family history of BRCA2 gene positive Z84.81    3.   Family history of breast cancer in a male Z80.3       Plan     Family history of breast cancer; Family history of BRCA2 gene positive;  Family history of breast cancer in a male  Offered germline cancer-genetic testing at this time versus deferring testing at this time or declining testing altogether.  Various test panel options were discussed. Glenda agreed to genetic testing through the [facility] 70-gene Multi-Cancer Panel (AIP, ALK, APC, VASYL, AXIN2, BAP1, BARD1, BLM, BMPR1A, BRCA1, BRCA2, BRIP1, CDC73, CDH1, CDK4, CDKN1B, CDKN2A, CHEK2, CTNNA1, DICER1, EGFR, EPCAM, FH, FLCN, GREM1, HOXB13, KIT, LZTR1, MAX, MBD4, MEN1, MET, MITF, MLH1, MSH2, MSH3, MSH6, MUTYH, NF1, NF2, NTHL1, PALB2, PDGFRA, PMS2, POLD1, POLE, POT1, KWZNT7U, PTCH1, PTEN, RAD51C, RAD51D, RB1, RET, SDHA, SDHAF2, SDHB, SDHC, SDHD, SMAD4, SMARCA4, SMARCB1, SMARCE1, STK11, SUFU, CZAM258, TP53, TSC1, TSC2, VHL).  Glenda has provided her informed consent to proceed. A blood draw was collected today.      Questions were encouraged and answered to the patient's satisfaction, and she verbalized understanding of  information and agreement with the plan.         Signed,    Katerin Mcclain MS, [facility]  Licensed - Certified Genetic Counselor  [facility] - Breast Surgery    06/16/2025

## 2025-06-12 ENCOUNTER — TELEPHONE (OUTPATIENT)
Dept: FAMILY MEDICINE | Facility: CLINIC | Age: 55
End: 2025-06-12
Payer: COMMERCIAL

## 2025-06-12 DIAGNOSIS — D70.8 OTHER NEUTROPENIA: ICD-10-CM

## 2025-06-12 DIAGNOSIS — Z00.00 WELLNESS EXAMINATION: ICD-10-CM

## 2025-06-12 DIAGNOSIS — G57.02 PIRIFORMIS SYNDROME OF LEFT SIDE: ICD-10-CM

## 2025-06-12 DIAGNOSIS — F41.9 ANXIETY: ICD-10-CM

## 2025-06-12 DIAGNOSIS — R73.01 ELEVATED FASTING GLUCOSE: ICD-10-CM

## 2025-06-12 DIAGNOSIS — K21.9 GASTROESOPHAGEAL REFLUX DISEASE WITHOUT ESOPHAGITIS: ICD-10-CM

## 2025-06-12 DIAGNOSIS — I83.813 VARICOSE VEINS OF BOTH LOWER EXTREMITIES WITH PAIN: ICD-10-CM

## 2025-06-12 DIAGNOSIS — R53.82 CHRONIC FATIGUE: ICD-10-CM

## 2025-06-12 DIAGNOSIS — Z13.6 ENCOUNTER FOR LIPID SCREENING FOR CARDIOVASCULAR DISEASE: ICD-10-CM

## 2025-06-12 DIAGNOSIS — Z80.3 FAMILY HISTORY OF BREAST CANCER: Primary | ICD-10-CM

## 2025-06-12 DIAGNOSIS — K21.9 GASTROESOPHAGEAL REFLUX DISEASE, UNSPECIFIED WHETHER ESOPHAGITIS PRESENT: ICD-10-CM

## 2025-06-12 DIAGNOSIS — E03.9 HYPOTHYROIDISM, UNSPECIFIED TYPE: ICD-10-CM

## 2025-06-12 DIAGNOSIS — Z13.220 ENCOUNTER FOR LIPID SCREENING FOR CARDIOVASCULAR DISEASE: ICD-10-CM

## 2025-06-12 DIAGNOSIS — S03.40XS SPRAIN OF TEMPOROMANDIBULAR JOINT, SEQUELA: ICD-10-CM

## 2025-06-12 DIAGNOSIS — G47.33 OSA (OBSTRUCTIVE SLEEP APNEA): ICD-10-CM

## 2025-06-12 NOTE — TELEPHONE ENCOUNTER
Copied from CRM #[id number]. Topic: Appointments - Appointment Rescheduling  >> Jun 12, 2025  8:07 AM Derrick wrote:  Who Called: Glenda Franklin    Caller is requesting assistance/information from provider's office.    Symptoms (please be specific):    How long has patient had these symptoms:    List of preferred pharmacies on file (remove unneeded): [unfilled]  If different, enter pharmacy into here including location and phone number:      Preferred Method of Contact: Phone Call  Patient's Preferred Phone Number on File: [phone]   Best Call Back Number, if different:    Additional Information: pt called to req wellness labs are ordered , pt would like to confirm/request  Vitiamin D ,  Folate and A1c  labs are ordered with wellness labs. Will complete at [facility] [place] lab.

## 2025-06-16 ENCOUNTER — OFFICE VISIT (OUTPATIENT)
Dept: SURGERY | Facility: CLINIC | Age: 55
End: 2025-06-16
Payer: COMMERCIAL

## 2025-06-16 ENCOUNTER — PATIENT MESSAGE (OUTPATIENT)
Dept: SURGERY | Facility: CLINIC | Age: 55
End: 2025-06-16

## 2025-06-16 DIAGNOSIS — Z84.81 FAMILY HISTORY OF BRCA2 GENE POSITIVE: ICD-10-CM

## 2025-06-16 DIAGNOSIS — Z80.3 FAMILY HISTORY OF BREAST CANCER IN MALE: Primary | ICD-10-CM

## 2025-06-16 DIAGNOSIS — Z80.3 FAMILY HISTORY OF BREAST CANCER: ICD-10-CM

## 2025-06-16 PROCEDURE — 99499 UNLISTED E&M SERVICE: CPT | Mod: S$GLB,,,

## 2025-06-16 PROCEDURE — 99999 PR PBB SHADOW E&M-EST. PATIENT-LVL II: CPT | Mod: PBBFAC,,,

## 2025-06-16 PROCEDURE — 96041 GENETIC COUNSELING SVC EA 30: CPT | Mod: S$GLB,,,

## 2025-06-17 ENCOUNTER — RESULTS FOLLOW-UP (OUTPATIENT)
Dept: FAMILY MEDICINE | Facility: CLINIC | Age: 55
End: 2025-06-17

## 2025-06-17 ENCOUNTER — LAB VISIT (OUTPATIENT)
Dept: LAB | Facility: HOSPITAL | Age: 55
End: 2025-06-17
Attending: INTERNAL MEDICINE
Payer: COMMERCIAL

## 2025-06-17 DIAGNOSIS — D70.8 OTHER NEUTROPENIA: ICD-10-CM

## 2025-06-17 DIAGNOSIS — Z13.6 ENCOUNTER FOR LIPID SCREENING FOR CARDIOVASCULAR DISEASE: ICD-10-CM

## 2025-06-17 DIAGNOSIS — Z13.220 ENCOUNTER FOR LIPID SCREENING FOR CARDIOVASCULAR DISEASE: ICD-10-CM

## 2025-06-17 DIAGNOSIS — I83.813 VARICOSE VEINS OF BOTH LOWER EXTREMITIES WITH PAIN: ICD-10-CM

## 2025-06-17 DIAGNOSIS — F41.9 ANXIETY: ICD-10-CM

## 2025-06-17 DIAGNOSIS — K21.9 GASTROESOPHAGEAL REFLUX DISEASE, UNSPECIFIED WHETHER ESOPHAGITIS PRESENT: ICD-10-CM

## 2025-06-17 DIAGNOSIS — R53.82 CHRONIC FATIGUE: ICD-10-CM

## 2025-06-17 DIAGNOSIS — Z80.3 FAMILY HISTORY OF BREAST CANCER IN MALE: ICD-10-CM

## 2025-06-17 DIAGNOSIS — R73.01 ELEVATED FASTING GLUCOSE: ICD-10-CM

## 2025-06-17 DIAGNOSIS — E03.9 HYPOTHYROIDISM, UNSPECIFIED TYPE: ICD-10-CM

## 2025-06-17 DIAGNOSIS — S03.40XS SPRAIN OF TEMPOROMANDIBULAR JOINT, SEQUELA: ICD-10-CM

## 2025-06-17 DIAGNOSIS — G47.33 OSA (OBSTRUCTIVE SLEEP APNEA): ICD-10-CM

## 2025-06-17 DIAGNOSIS — G57.02 PIRIFORMIS SYNDROME OF LEFT SIDE: ICD-10-CM

## 2025-06-17 DIAGNOSIS — K21.9 GASTROESOPHAGEAL REFLUX DISEASE WITHOUT ESOPHAGITIS: ICD-10-CM

## 2025-06-17 DIAGNOSIS — Z80.3 FAMILY HISTORY OF BREAST CANCER: ICD-10-CM

## 2025-06-17 DIAGNOSIS — Z00.00 WELLNESS EXAMINATION: ICD-10-CM

## 2025-06-17 LAB
25(OH)D3+25(OH)D2 SERPL-MCNC: 72 NG/ML (ref 30–80)
ALBUMIN SERPL-MCNC: 4.1 G/DL (ref 3.5–5)
ALBUMIN/GLOB SERPL: 1.4 RATIO (ref 1.1–2)
ALP SERPL-CCNC: 51 UNIT/L (ref 40–150)
ALT SERPL-CCNC: 40 UNIT/L (ref 0–55)
ANION GAP SERPL CALC-SCNC: 6 MEQ/L
AST SERPL-CCNC: 24 UNIT/L (ref 11–45)
BASOPHILS # BLD AUTO: 0.02 X10(3)/MCL
BASOPHILS NFR BLD AUTO: 0.5 %
BILIRUB SERPL-MCNC: 0.6 MG/DL
BUN SERPL-MCNC: 16.5 MG/DL (ref 9.8–20.1)
CALCIUM SERPL-MCNC: 9.3 MG/DL (ref 8.4–10.2)
CHLORIDE SERPL-SCNC: 109 MMOL/L (ref 98–107)
CHOLEST SERPL-MCNC: 197 MG/DL
CHOLEST/HDLC SERPL: 4 {RATIO} (ref 0–5)
CO2 SERPL-SCNC: 26 MMOL/L (ref 22–29)
CREAT SERPL-MCNC: 0.8 MG/DL (ref 0.55–1.02)
CREAT/UREA NIT SERPL: 21
EOSINOPHIL # BLD AUTO: 0.17 X10(3)/MCL (ref 0–0.9)
EOSINOPHIL NFR BLD AUTO: 4.2 %
ERYTHROCYTE [DISTWIDTH] IN BLOOD BY AUTOMATED COUNT: 12.4 % (ref 11.5–17)
EST. AVERAGE GLUCOSE BLD GHB EST-MCNC: 99.7 MG/DL
GFR SERPLBLD CREATININE-BSD FMLA CKD-EPI: >60 ML/MIN/1.73/M2
GLOBULIN SER-MCNC: 3 GM/DL (ref 2.4–3.5)
GLUCOSE SERPL-MCNC: 101 MG/DL (ref 74–100)
HBA1C MFR BLD: 5.1 %
HCT VFR BLD AUTO: 43.2 % (ref 37–47)
HDLC SERPL-MCNC: 52 MG/DL (ref 35–60)
HGB BLD-MCNC: 14.9 G/DL (ref 12–16)
IMM GRANULOCYTES # BLD AUTO: 0 X10(3)/MCL (ref 0–0.04)
IMM GRANULOCYTES NFR BLD AUTO: 0 %
LDLC SERPL CALC-MCNC: 124 MG/DL (ref 50–140)
LYMPHOCYTES # BLD AUTO: 1.7 X10(3)/MCL (ref 0.6–4.6)
LYMPHOCYTES NFR BLD AUTO: 41.9 %
MCH RBC QN AUTO: 30.3 PG (ref 27–31)
MCHC RBC AUTO-ENTMCNC: 34.5 G/DL (ref 33–36)
MCV RBC AUTO: 88 FL (ref 80–94)
MONOCYTES # BLD AUTO: 0.27 X10(3)/MCL (ref 0.1–1.3)
MONOCYTES NFR BLD AUTO: 6.7 %
NEUTROPHILS # BLD AUTO: 1.9 X10(3)/MCL (ref 2.1–9.2)
NEUTROPHILS NFR BLD AUTO: 46.7 %
NRBC BLD AUTO-RTO: 0 %
PLATELET # BLD AUTO: 229 X10(3)/MCL (ref 130–400)
PMV BLD AUTO: 9.4 FL (ref 7.4–10.4)
POTASSIUM SERPL-SCNC: 4.1 MMOL/L (ref 3.5–5.1)
PROT SERPL-MCNC: 7.1 GM/DL (ref 6.4–8.3)
RBC # BLD AUTO: 4.91 X10(6)/MCL (ref 4.2–5.4)
SODIUM SERPL-SCNC: 141 MMOL/L (ref 136–145)
TRIGL SERPL-MCNC: 105 MG/DL (ref 37–140)
TSH SERPL-ACNC: 0.36 UIU/ML (ref 0.35–4.94)
VLDLC SERPL CALC-MCNC: 21 MG/DL
WBC # BLD AUTO: 4.06 X10(3)/MCL (ref 4.5–11.5)

## 2025-06-17 PROCEDURE — 82306 VITAMIN D 25 HYDROXY: CPT

## 2025-06-17 PROCEDURE — 85025 COMPLETE CBC W/AUTO DIFF WBC: CPT

## 2025-06-17 PROCEDURE — 36415 COLL VENOUS BLD VENIPUNCTURE: CPT

## 2025-06-17 PROCEDURE — 80061 LIPID PANEL: CPT

## 2025-06-17 PROCEDURE — 80053 COMPREHEN METABOLIC PANEL: CPT

## 2025-06-17 PROCEDURE — 84443 ASSAY THYROID STIM HORMONE: CPT

## 2025-06-17 PROCEDURE — 83036 HEMOGLOBIN GLYCOSYLATED A1C: CPT

## 2025-06-24 ENCOUNTER — OFFICE VISIT (OUTPATIENT)
Dept: FAMILY MEDICINE | Facility: CLINIC | Age: 55
End: 2025-06-24
Payer: COMMERCIAL

## 2025-06-24 VITALS
DIASTOLIC BLOOD PRESSURE: 72 MMHG | WEIGHT: 157.38 LBS | OXYGEN SATURATION: 97 % | TEMPERATURE: 98 F | RESPIRATION RATE: 16 BRPM | BODY MASS INDEX: 29.71 KG/M2 | HEIGHT: 61 IN | SYSTOLIC BLOOD PRESSURE: 118 MMHG | HEART RATE: 78 BPM

## 2025-06-24 DIAGNOSIS — F41.9 ANXIETY: ICD-10-CM

## 2025-06-24 DIAGNOSIS — E03.9 HYPOTHYROIDISM, UNSPECIFIED TYPE: ICD-10-CM

## 2025-06-24 DIAGNOSIS — Z00.00 WELLNESS EXAMINATION: Primary | ICD-10-CM

## 2025-06-24 DIAGNOSIS — N95.8 GENITOURINARY SYNDROME OF MENOPAUSE: ICD-10-CM

## 2025-06-24 DIAGNOSIS — Z80.3 FAMILY HISTORY OF BREAST CANCER: ICD-10-CM

## 2025-06-24 PROCEDURE — 3078F DIAST BP <80 MM HG: CPT | Mod: CPTII,,, | Performed by: INTERNAL MEDICINE

## 2025-06-24 PROCEDURE — 99396 PREV VISIT EST AGE 40-64: CPT | Mod: ,,, | Performed by: INTERNAL MEDICINE

## 2025-06-24 PROCEDURE — 1160F RVW MEDS BY RX/DR IN RCRD: CPT | Mod: CPTII,,, | Performed by: INTERNAL MEDICINE

## 2025-06-24 PROCEDURE — 3044F HG A1C LEVEL LT 7.0%: CPT | Mod: CPTII,,, | Performed by: INTERNAL MEDICINE

## 2025-06-24 PROCEDURE — 3074F SYST BP LT 130 MM HG: CPT | Mod: CPTII,,, | Performed by: INTERNAL MEDICINE

## 2025-06-24 PROCEDURE — 1159F MED LIST DOCD IN RCRD: CPT | Mod: CPTII,,, | Performed by: INTERNAL MEDICINE

## 2025-06-24 PROCEDURE — 3008F BODY MASS INDEX DOCD: CPT | Mod: CPTII,,, | Performed by: INTERNAL MEDICINE

## 2025-06-24 RX ORDER — ESTRADIOL 10 UG/1
1 TABLET, FILM COATED VAGINAL
Qty: 14 TABLET | Refills: 0 | Status: SHIPPED | OUTPATIENT
Start: 2025-06-26

## 2025-06-24 NOTE — ASSESSMENT & PLAN NOTE
Stable  Patient with tyer cuzick score ~17% referred to do genetic testing, waiting for results to return

## 2025-06-24 NOTE — PROGRESS NOTES
Subjective:      Patient ID: Glenda Franklin is a 54 y.o. female.    Chief Complaint: Annual Exam (Annual Wellness)    HPI        Doing well today, no acute issues        S/p EGD for esophagitis, s/p dilation  Colonoscopy done with polyp removed        Chronic Medical Conditions:  Hashimoto's thyroiditis, thyroid nodule - Dr. Mendosa following  Compliant with medication    chronic fatigue: unsure of etiology; sounds like GEMINI has a big part of this along with stress; her son has crohn's disease and she finds herself so focused on his health or with her work that she does not take time for herself     Rosacea:  -seen by dermatology, Dr. Lerma  Provided Rx minocycline      Family history of breast cancer: genetic counseling pending       GYN: Dr. Ann  last pap smear: 2022, negative for malignancy  Mammogram: [facility] 23 negative for malignancy  Mammogram scheduled 2024   F/u on status patient says this was done, will request copy of report   Colon Cancer Screenin2023 , repeat in 5 years ; Dr. 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 Negative for chills and fever.   HENT:  Negative for congestion, sinus pressure and sore throat.    Respiratory:  Negative for cough and shortness of breath.    Cardiovascular:  Negative for chest pain and palpitations.   Gastrointestinal:  Negative for abdominal pain and nausea.   Skin:  Negative for rash.       Objective:     Vitals:    25 1034   BP: 118/72   BP Location: Left arm   Patient Position: Sitting   Pulse: 78   Resp: 16   Temp: 97.8  "°F (36.6 °C)   TempSrc: Oral   SpO2: 97%   Weight: 71.4 kg (157 lb 6.4 oz)   Height: 5' 1" (1.549 m)     Physical Exam  Vitals and nursing note reviewed.   Constitutional:       General: She is not in acute distress.     Appearance: Normal appearance. She is not ill-appearing.   HENT:      Head: Normocephalic and atraumatic.   Cardiovascular:      Rate and Rhythm: Normal rate and regular rhythm.   Pulmonary:      Effort: Pulmonary effort is normal. No respiratory distress.      Breath sounds: Normal breath sounds. No wheezing.   Abdominal:      General: Abdomen is flat. There is no distension.      Palpations: Abdomen is soft.      Tenderness: There is no abdominal tenderness. There is no guarding.   Musculoskeletal:      Cervical back: Neck supple.      Right lower leg: No edema.      Left lower leg: No edema.   Lymphadenopathy:      Cervical: No cervical adenopathy.   Neurological:      Mental Status: She is alert.       Assessment/Plan:     1. Wellness examination  Labs reviewed, WBC slightly low, average, no B symptoms  2. Anxiety  Assessment & Plan:  Stable sx controlled      3. Family history of breast cancer  Assessment & Plan:  Stable  Patient with tyer cuzick score ~17% referred to do genetic testing, waiting for results to return      4. Hypothyroidism, unspecified type  Assessment & Plan:  Stable continue liothyronin and levothyroxine      5. Genitourinary syndrome of menopause  Overview:  Vaginal dryness reported, some concern of future urine continence issues, did not like estrace, will try vagimen tablets      Other orders  -     estradioL (VAGIFEM) 10 mcg Tab; Place 1 tablet (10 mcg total) vaginally twice a week.  Dispense: 14 tablet; Refill: 0       Follow up in about 6 months (around 12/24/2025) for Follow Up - Chronic Conditions.    This includes face to face time and non-face to face time preparing to see the patient (eg, review of tests), obtaining and/or reviewing separately obtained history, " documenting clinical information in the electronic or other health record, independently interpreting results and communicating results to the patient/family/caregiver, or care coordinator.

## 2025-06-25 ENCOUNTER — PATIENT MESSAGE (OUTPATIENT)
Dept: SURGERY | Facility: CLINIC | Age: 55
End: 2025-06-25
Payer: COMMERCIAL

## 2025-06-30 DIAGNOSIS — J30.2 SEASONAL ALLERGIES: ICD-10-CM

## 2025-06-30 RX ORDER — MONTELUKAST SODIUM 10 MG/1
10 TABLET ORAL
Qty: 30 TABLET | Refills: 11 | Status: SHIPPED | OUTPATIENT
Start: 2025-06-30

## 2025-09-04 RX ORDER — ESTRADIOL 10 UG/1
TABLET, FILM COATED VAGINAL
Qty: 14 TABLET | Refills: 1 | Status: SHIPPED | OUTPATIENT
Start: 2025-09-04